# Patient Record
Sex: FEMALE | URBAN - METROPOLITAN AREA
[De-identification: names, ages, dates, MRNs, and addresses within clinical notes are randomized per-mention and may not be internally consistent; named-entity substitution may affect disease eponyms.]

---

## 2024-03-27 ENCOUNTER — INPATIENT (INPATIENT)
Facility: HOSPITAL | Age: 49
LOS: 2 days | Discharge: HOME | DRG: 389 | End: 2024-03-30
Attending: INTERNAL MEDICINE | Admitting: INTERNAL MEDICINE
Payer: MEDICAID

## 2024-03-27 VITALS
HEART RATE: 93 BPM | SYSTOLIC BLOOD PRESSURE: 111 MMHG | DIASTOLIC BLOOD PRESSURE: 52 MMHG | OXYGEN SATURATION: 97 % | TEMPERATURE: 98 F | RESPIRATION RATE: 20 BRPM | WEIGHT: 188.5 LBS | HEIGHT: 64 IN

## 2024-03-27 LAB
ALBUMIN SERPL ELPH-MCNC: 3.8 G/DL — SIGNIFICANT CHANGE UP (ref 3.3–5.2)
ALP SERPL-CCNC: 90 U/L — SIGNIFICANT CHANGE UP (ref 40–120)
ALT FLD-CCNC: 21 U/L — SIGNIFICANT CHANGE UP
ANION GAP SERPL CALC-SCNC: 14 MMOL/L — SIGNIFICANT CHANGE UP (ref 5–17)
APPEARANCE UR: CLEAR — SIGNIFICANT CHANGE UP
AST SERPL-CCNC: 30 U/L — SIGNIFICANT CHANGE UP
BASOPHILS # BLD AUTO: 0.04 K/UL — SIGNIFICANT CHANGE UP (ref 0–0.2)
BASOPHILS NFR BLD AUTO: 0.6 % — SIGNIFICANT CHANGE UP (ref 0–2)
BILIRUB SERPL-MCNC: 0.3 MG/DL — LOW (ref 0.4–2)
BILIRUB UR-MCNC: NEGATIVE — SIGNIFICANT CHANGE UP
BUN SERPL-MCNC: 11.5 MG/DL — SIGNIFICANT CHANGE UP (ref 8–20)
CALCIUM SERPL-MCNC: 8.6 MG/DL — SIGNIFICANT CHANGE UP (ref 8.4–10.5)
CHLORIDE SERPL-SCNC: 104 MMOL/L — SIGNIFICANT CHANGE UP (ref 96–108)
CO2 SERPL-SCNC: 22 MMOL/L — SIGNIFICANT CHANGE UP (ref 22–29)
COLOR SPEC: YELLOW — SIGNIFICANT CHANGE UP
CREAT SERPL-MCNC: 0.51 MG/DL — SIGNIFICANT CHANGE UP (ref 0.5–1.3)
DIFF PNL FLD: NEGATIVE — SIGNIFICANT CHANGE UP
EGFR: 116 ML/MIN/1.73M2 — SIGNIFICANT CHANGE UP
EOSINOPHIL # BLD AUTO: 0.31 K/UL — SIGNIFICANT CHANGE UP (ref 0–0.5)
EOSINOPHIL NFR BLD AUTO: 4.3 % — SIGNIFICANT CHANGE UP (ref 0–6)
GLUCOSE SERPL-MCNC: 92 MG/DL — SIGNIFICANT CHANGE UP (ref 70–99)
GLUCOSE UR QL: NEGATIVE MG/DL — SIGNIFICANT CHANGE UP
HCG SERPL-ACNC: <4 MIU/ML — SIGNIFICANT CHANGE UP
HCT VFR BLD CALC: 27.3 % — LOW (ref 34.5–45)
HGB BLD-MCNC: 7.4 G/DL — LOW (ref 11.5–15.5)
IMM GRANULOCYTES NFR BLD AUTO: 0.3 % — SIGNIFICANT CHANGE UP (ref 0–0.9)
KETONES UR-MCNC: NEGATIVE MG/DL — SIGNIFICANT CHANGE UP
LACTATE BLDV-MCNC: 0.8 MMOL/L — SIGNIFICANT CHANGE UP (ref 0.5–2)
LEUKOCYTE ESTERASE UR-ACNC: NEGATIVE — SIGNIFICANT CHANGE UP
LYMPHOCYTES # BLD AUTO: 2.3 K/UL — SIGNIFICANT CHANGE UP (ref 1–3.3)
LYMPHOCYTES # BLD AUTO: 31.7 % — SIGNIFICANT CHANGE UP (ref 13–44)
MCHC RBC-ENTMCNC: 17.8 PG — LOW (ref 27–34)
MCHC RBC-ENTMCNC: 27.1 GM/DL — LOW (ref 32–36)
MCV RBC AUTO: 65.6 FL — LOW (ref 80–100)
MONOCYTES # BLD AUTO: 0.52 K/UL — SIGNIFICANT CHANGE UP (ref 0–0.9)
MONOCYTES NFR BLD AUTO: 7.2 % — SIGNIFICANT CHANGE UP (ref 2–14)
NEUTROPHILS # BLD AUTO: 4.07 K/UL — SIGNIFICANT CHANGE UP (ref 1.8–7.4)
NEUTROPHILS NFR BLD AUTO: 55.9 % — SIGNIFICANT CHANGE UP (ref 43–77)
NITRITE UR-MCNC: NEGATIVE — SIGNIFICANT CHANGE UP
PH UR: 7 — SIGNIFICANT CHANGE UP (ref 5–8)
PLATELET # BLD AUTO: 252 K/UL — SIGNIFICANT CHANGE UP (ref 150–400)
POTASSIUM SERPL-MCNC: 3.9 MMOL/L — SIGNIFICANT CHANGE UP (ref 3.5–5.3)
POTASSIUM SERPL-SCNC: 3.9 MMOL/L — SIGNIFICANT CHANGE UP (ref 3.5–5.3)
PROT SERPL-MCNC: 7 G/DL — SIGNIFICANT CHANGE UP (ref 6.6–8.7)
PROT UR-MCNC: NEGATIVE MG/DL — SIGNIFICANT CHANGE UP
RBC # BLD: 4.16 M/UL — SIGNIFICANT CHANGE UP (ref 3.8–5.2)
RBC # FLD: 20.5 % — HIGH (ref 10.3–14.5)
SODIUM SERPL-SCNC: 140 MMOL/L — SIGNIFICANT CHANGE UP (ref 135–145)
SP GR SPEC: 1.03 — SIGNIFICANT CHANGE UP (ref 1–1.03)
UROBILINOGEN FLD QL: 2 MG/DL (ref 0.2–1)
WBC # BLD: 7.26 K/UL — SIGNIFICANT CHANGE UP (ref 3.8–10.5)
WBC # FLD AUTO: 7.26 K/UL — SIGNIFICANT CHANGE UP (ref 3.8–10.5)

## 2024-03-27 PROCEDURE — 99285 EMERGENCY DEPT VISIT HI MDM: CPT

## 2024-03-27 RX ORDER — ACETAMINOPHEN 500 MG
1000 TABLET ORAL ONCE
Refills: 0 | Status: COMPLETED | OUTPATIENT
Start: 2024-03-27 | End: 2024-03-27

## 2024-03-27 RX ORDER — IOHEXOL 300 MG/ML
30 INJECTION, SOLUTION INTRAVENOUS ONCE
Refills: 0 | Status: COMPLETED | OUTPATIENT
Start: 2024-03-27 | End: 2024-03-27

## 2024-03-27 RX ORDER — SODIUM CHLORIDE 9 MG/ML
1000 INJECTION INTRAMUSCULAR; INTRAVENOUS; SUBCUTANEOUS
Refills: 0 | Status: DISCONTINUED | OUTPATIENT
Start: 2024-03-27 | End: 2024-03-30

## 2024-03-27 RX ADMIN — SODIUM CHLORIDE 100 MILLILITER(S): 9 INJECTION INTRAMUSCULAR; INTRAVENOUS; SUBCUTANEOUS at 21:51

## 2024-03-27 RX ADMIN — Medication 400 MILLIGRAM(S): at 21:51

## 2024-03-27 RX ADMIN — IOHEXOL 30 MILLILITER(S): 300 INJECTION, SOLUTION INTRAVENOUS at 21:51

## 2024-03-27 NOTE — ED PROVIDER NOTE - PROGRESS NOTE DETAILS
Patient Pending CT scan. ~Nick ESCOBAR Patient pending final recs from Surgery and GYN. Case signed out to Lis ESCOBAR. ~SHAWN Romero SHAWN LOPEZ, patient re-assessed, comfortably sleeping, endorsing nausea, seen by GYN and surgery, GYN recommends rpt CBC and surgery pending final disposition by attending for CT findings of ileus vs pSBO.  Will continue to monitor. informed by RN H&H dropped, discussion had with patient for transfusion, declined as is a Jehov[employer] witness and does not believe in transfusions, informed of risk of benefits, including sepsis, death, permanent disability, patient understands and agrees to proceed. spoke to GYN not planning any surgical intervention, recommends serial H&H checks, possible iron transfusions, patient reports h/o anemia has gotten iron transfusions in past, still endorsing 6/10 abdominal pain localized to LLQ.  Surgery team also not planning on any surgical intervention.  Surgery and GYN spoke to each other, discussion was had for admission to GYN service. GYN advises medical admission, case d/w hospitalist Teddy, who also feels as though patient should be admitted to GYN service.  Plan to contact GYN directly for discussion.

## 2024-03-27 NOTE — ED ADULT TRIAGE NOTE - CHIEF COMPLAINT QUOTE
Patient presents to ED with c/o LLQ abdominal pain that started yesterday.  Denies N/V/D.  No meds PTA

## 2024-03-27 NOTE — ED PROVIDER NOTE - OBJECTIVE STATEMENT
47 yo female with LLQ pain since yesterday, constant non radiating  hx bowel obstruction  hx constipation, asthma - uses inhaler as needed   nka soc no cig or alc  no other symptoms

## 2024-03-27 NOTE — ED PROVIDER NOTE - ATTENDING APP SHARED VISIT CONTRIBUTION OF CARE
I performed the initial face to face bedside interview with this patient regarding history of present illness, review of symptoms and past medical, social and family history.  I completed an independent physical examination.  I was the initial provider who evaluated this patient.  The history, review of symptoms and examination was documented by me and I attest to the accuracy of the documentation.  I have signed out the follow up of any pending tests (i.e. labs, radiological studies) to the PA.  I have discussed the patient’s plan of care and disposition with the PA.  pt with LLQ abd pain and hx constipation and bowel obstruction   +TTP LLQ no G/R  plan is iv ivf iv pain meds labs ua preg test CT + oral and iv contrast to further assess condition  ddx obstruction, diverticulitis, other gi or gu etiology

## 2024-03-27 NOTE — ED PROVIDER NOTE - NS ED ROS FT
Constitutional: no fever, no chills  Eyes: no pain, no redness, no visual changes.  ENMT: no ear pain, no hearing problems, no nasal congestion/drainage, no throat pain, no neck pain/stiffness  CV: no chest pain, no edema  Resp: no cough, no dyspnea  GI: + abdominal pain, no bloating, + constipation, no diarrhea, no nausea, no vomiting.  : no dysuria, no hematuria  MSK: no msk pain, no weakness  Skin: no lesions, and no rashes   Neuro: no LOC, no headache, no sensory deficits, and no weakness

## 2024-03-27 NOTE — ED PROVIDER NOTE - CLINICAL SUMMARY MEDICAL DECISION MAKING FREE TEXT BOX
pt with LLQ abd pain and hx constipation and bowel obstruction   +TTP LLQ no G/R  plan is iv ivf iv pain meds labs ua preg test CT + oral and iv contrast to further assess condition  ddx obstruction, diverticulitis, other gi or gu etiology pt with LLQ abd pain and hx constipation and bowel obstruction   +TTP LLQ no G/R  plan is iv ivf iv pain meds labs ua preg test CT + oral and iv contrast to further assess condition  ddx obstruction, diverticulitis, other gi or gu etiology  --Labs revealed no leukocytosis, anemia, CMP grossly wnl, lactate wnl, urine wnl,   --CT revealed Gastric Meka-en-Y postoperative changes with patulous distention of the proximal and distal anastomosis, most pronounced proximally. Mildly prominent small bowel loops in the right side of the abdomen, ileus versus partial small bowel obstruction and adnexal cyst.   -U/S REVEALED 4.0 cm complex left ovarian cyst most likely reflecting hemorrhagic cyst.   Differential diagnosis includes endometrioma.  -GYN consulted XXX  -Surgery consulted XXX

## 2024-03-27 NOTE — ED PROVIDER NOTE - PHYSICAL EXAMINATION
General: well appearing, NAD  Head:  NC, AT  Eyes: EOMI, no scleral icterus  Ears: no erythema/drainage  Nose: midline, no bleeding/drainage  Throat: MMM  Cardiac: RRR, no apparent murmurs, no lower extremity edema  Respiratory: CTABL, equal chest wall expansions  Abdomen: soft, ND, +TTP LLQ, no rebound, no guarding, nonperitonitic  MSK/Vascular: full ROM, soft compartments, warm extremities  Neuro: AAOx3, sensation to light touch intact, finger to nose coordination intact, cranial nerves 2-12 intact  Psych: calm, cooperative, normal affect Xolair Counseling:  Patient informed of potential adverse effects including but not limited to fever, muscle aches, rash and allergic reactions.  The patient verbalized understanding of the proper use and possible adverse effects of Xolair.  All of the patient's questions and concerns were addressed.

## 2024-03-28 DIAGNOSIS — Z98.890 OTHER SPECIFIED POSTPROCEDURAL STATES: Chronic | ICD-10-CM

## 2024-03-28 DIAGNOSIS — N83.209 UNSPECIFIED OVARIAN CYST, UNSPECIFIED SIDE: ICD-10-CM

## 2024-03-28 LAB
ABO RH CONFIRMATION: SIGNIFICANT CHANGE UP
BASOPHILS # BLD AUTO: 0.04 K/UL — SIGNIFICANT CHANGE UP (ref 0–0.2)
BASOPHILS NFR BLD AUTO: 0.7 % — SIGNIFICANT CHANGE UP (ref 0–2)
BLD GP AB SCN SERPL QL: SIGNIFICANT CHANGE UP
EOSINOPHIL # BLD AUTO: 0.25 K/UL — SIGNIFICANT CHANGE UP (ref 0–0.5)
EOSINOPHIL NFR BLD AUTO: 4.2 % — SIGNIFICANT CHANGE UP (ref 0–6)
FOLATE SERPL-MCNC: >20 NG/ML — SIGNIFICANT CHANGE UP
HCT VFR BLD CALC: 24.1 % — LOW (ref 34.5–45)
HCT VFR BLD CALC: 27.6 % — LOW (ref 34.5–45)
HCT VFR BLD CALC: 28.6 % — LOW (ref 34.5–45)
HGB BLD-MCNC: 6.7 G/DL — CRITICAL LOW (ref 11.5–15.5)
HGB BLD-MCNC: 7.5 G/DL — LOW (ref 11.5–15.5)
HGB BLD-MCNC: 7.7 G/DL — LOW (ref 11.5–15.5)
IMM GRANULOCYTES NFR BLD AUTO: 0.3 % — SIGNIFICANT CHANGE UP (ref 0–0.9)
LYMPHOCYTES # BLD AUTO: 2.28 K/UL — SIGNIFICANT CHANGE UP (ref 1–3.3)
LYMPHOCYTES # BLD AUTO: 37.9 % — SIGNIFICANT CHANGE UP (ref 13–44)
MCHC RBC-ENTMCNC: 17.9 PG — LOW (ref 27–34)
MCHC RBC-ENTMCNC: 18 PG — LOW (ref 27–34)
MCHC RBC-ENTMCNC: 18.1 PG — LOW (ref 27–34)
MCHC RBC-ENTMCNC: 26.9 GM/DL — LOW (ref 32–36)
MCHC RBC-ENTMCNC: 27.2 GM/DL — LOW (ref 32–36)
MCHC RBC-ENTMCNC: 27.8 GM/DL — LOW (ref 32–36)
MCV RBC AUTO: 65.1 FL — LOW (ref 80–100)
MCV RBC AUTO: 66 FL — LOW (ref 80–100)
MCV RBC AUTO: 66.8 FL — LOW (ref 80–100)
MONOCYTES # BLD AUTO: 0.47 K/UL — SIGNIFICANT CHANGE UP (ref 0–0.9)
MONOCYTES NFR BLD AUTO: 7.8 % — SIGNIFICANT CHANGE UP (ref 2–14)
NEUTROPHILS # BLD AUTO: 2.96 K/UL — SIGNIFICANT CHANGE UP (ref 1.8–7.4)
NEUTROPHILS NFR BLD AUTO: 49.1 % — SIGNIFICANT CHANGE UP (ref 43–77)
PLATELET # BLD AUTO: 251 K/UL — SIGNIFICANT CHANGE UP (ref 150–400)
PLATELET # BLD AUTO: 253 K/UL — SIGNIFICANT CHANGE UP (ref 150–400)
PLATELET # BLD AUTO: 274 K/UL — SIGNIFICANT CHANGE UP (ref 150–400)
RBC # BLD: 3.7 M/UL — LOW (ref 3.8–5.2)
RBC # BLD: 4.18 M/UL — SIGNIFICANT CHANGE UP (ref 3.8–5.2)
RBC # BLD: 4.28 M/UL — SIGNIFICANT CHANGE UP (ref 3.8–5.2)
RBC # FLD: 20.3 % — HIGH (ref 10.3–14.5)
RBC # FLD: 20.6 % — HIGH (ref 10.3–14.5)
RBC # FLD: 20.7 % — HIGH (ref 10.3–14.5)
VIT B12 SERPL-MCNC: >2000 PG/ML — HIGH (ref 232–1245)
WBC # BLD: 6.02 K/UL — SIGNIFICANT CHANGE UP (ref 3.8–10.5)
WBC # BLD: 6.12 K/UL — SIGNIFICANT CHANGE UP (ref 3.8–10.5)
WBC # BLD: 8.3 K/UL — SIGNIFICANT CHANGE UP (ref 3.8–10.5)
WBC # FLD AUTO: 6.02 K/UL — SIGNIFICANT CHANGE UP (ref 3.8–10.5)
WBC # FLD AUTO: 6.12 K/UL — SIGNIFICANT CHANGE UP (ref 3.8–10.5)
WBC # FLD AUTO: 8.3 K/UL — SIGNIFICANT CHANGE UP (ref 3.8–10.5)

## 2024-03-28 PROCEDURE — 76830 TRANSVAGINAL US NON-OB: CPT | Mod: 26

## 2024-03-28 PROCEDURE — 99253 IP/OBS CNSLTJ NEW/EST LOW 45: CPT

## 2024-03-28 PROCEDURE — 99255 IP/OBS CONSLTJ NEW/EST HI 80: CPT

## 2024-03-28 PROCEDURE — 74177 CT ABD & PELVIS W/CONTRAST: CPT | Mod: 26,MC

## 2024-03-28 PROCEDURE — 76856 US EXAM PELVIC COMPLETE: CPT | Mod: 26

## 2024-03-28 PROCEDURE — 99223 1ST HOSP IP/OBS HIGH 75: CPT

## 2024-03-28 RX ORDER — IRON SUCROSE 20 MG/ML
200 INJECTION, SOLUTION INTRAVENOUS ONCE
Refills: 0 | Status: COMPLETED | OUTPATIENT
Start: 2024-03-28 | End: 2024-03-28

## 2024-03-28 RX ORDER — ERYTHROPOIETIN 10000 [IU]/ML
20000 INJECTION, SOLUTION INTRAVENOUS; SUBCUTANEOUS
Refills: 0 | Status: DISCONTINUED | OUTPATIENT
Start: 2024-03-28 | End: 2024-03-28

## 2024-03-28 RX ORDER — MORPHINE SULFATE 50 MG/1
4 CAPSULE, EXTENDED RELEASE ORAL ONCE
Refills: 0 | Status: DISCONTINUED | OUTPATIENT
Start: 2024-03-28 | End: 2024-03-28

## 2024-03-28 RX ORDER — ONDANSETRON 8 MG/1
4 TABLET, FILM COATED ORAL ONCE
Refills: 0 | Status: COMPLETED | OUTPATIENT
Start: 2024-03-28 | End: 2024-03-28

## 2024-03-28 RX ORDER — METOCLOPRAMIDE HCL 10 MG
10 TABLET ORAL ONCE
Refills: 0 | Status: COMPLETED | OUTPATIENT
Start: 2024-03-28 | End: 2024-03-28

## 2024-03-28 RX ORDER — INFLUENZA VIRUS VACCINE 15; 15; 15; 15 UG/.5ML; UG/.5ML; UG/.5ML; UG/.5ML
0.5 SUSPENSION INTRAMUSCULAR ONCE
Refills: 0 | Status: DISCONTINUED | OUTPATIENT
Start: 2024-03-28 | End: 2024-03-30

## 2024-03-28 RX ORDER — PANTOPRAZOLE SODIUM 20 MG/1
40 TABLET, DELAYED RELEASE ORAL
Refills: 0 | Status: DISCONTINUED | OUTPATIENT
Start: 2024-03-28 | End: 2024-03-30

## 2024-03-28 RX ORDER — FOLIC ACID 0.8 MG
1 TABLET ORAL DAILY
Refills: 0 | Status: DISCONTINUED | OUTPATIENT
Start: 2024-03-28 | End: 2024-03-30

## 2024-03-28 RX ORDER — PREGABALIN 225 MG/1
1000 CAPSULE ORAL ONCE
Refills: 0 | Status: COMPLETED | OUTPATIENT
Start: 2024-03-28 | End: 2024-03-28

## 2024-03-28 RX ORDER — ERYTHROPOIETIN 10000 [IU]/ML
20000 INJECTION, SOLUTION INTRAVENOUS; SUBCUTANEOUS
Refills: 0 | Status: DISCONTINUED | OUTPATIENT
Start: 2024-03-28 | End: 2024-03-30

## 2024-03-28 RX ORDER — ASCORBIC ACID 60 MG
500 TABLET,CHEWABLE ORAL DAILY
Refills: 0 | Status: DISCONTINUED | OUTPATIENT
Start: 2024-03-28 | End: 2024-03-30

## 2024-03-28 RX ORDER — ACETAMINOPHEN 500 MG
1000 TABLET ORAL ONCE
Refills: 0 | Status: COMPLETED | OUTPATIENT
Start: 2024-03-28 | End: 2024-03-28

## 2024-03-28 RX ORDER — IRON SUCROSE 20 MG/ML
200 INJECTION, SOLUTION INTRAVENOUS EVERY 24 HOURS
Refills: 0 | Status: DISCONTINUED | OUTPATIENT
Start: 2024-03-28 | End: 2024-03-30

## 2024-03-28 RX ORDER — KETOROLAC TROMETHAMINE 30 MG/ML
30 SYRINGE (ML) INJECTION ONCE
Refills: 0 | Status: DISCONTINUED | OUTPATIENT
Start: 2024-03-28 | End: 2024-03-28

## 2024-03-28 RX ADMIN — SODIUM CHLORIDE 100 MILLILITER(S): 9 INJECTION INTRAMUSCULAR; INTRAVENOUS; SUBCUTANEOUS at 08:09

## 2024-03-28 RX ADMIN — ERYTHROPOIETIN 20000 UNIT(S): 10000 INJECTION, SOLUTION INTRAVENOUS; SUBCUTANEOUS at 14:10

## 2024-03-28 RX ADMIN — Medication 10 MILLIGRAM(S): at 10:06

## 2024-03-28 RX ADMIN — MORPHINE SULFATE 4 MILLIGRAM(S): 50 CAPSULE, EXTENDED RELEASE ORAL at 04:43

## 2024-03-28 RX ADMIN — ONDANSETRON 4 MILLIGRAM(S): 8 TABLET, FILM COATED ORAL at 08:09

## 2024-03-28 RX ADMIN — Medication 400 MILLIGRAM(S): at 23:45

## 2024-03-28 RX ADMIN — PREGABALIN 1000 MICROGRAM(S): 225 CAPSULE ORAL at 15:59

## 2024-03-28 RX ADMIN — Medication 500 MILLIGRAM(S): at 15:59

## 2024-03-28 RX ADMIN — IRON SUCROSE 110 MILLIGRAM(S): 20 INJECTION, SOLUTION INTRAVENOUS at 10:58

## 2024-03-28 RX ADMIN — Medication 30 MILLIGRAM(S): at 11:00

## 2024-03-28 RX ADMIN — Medication 1 MILLIGRAM(S): at 14:10

## 2024-03-28 RX ADMIN — Medication 30 MILLIGRAM(S): at 10:05

## 2024-03-28 RX ADMIN — IRON SUCROSE 200 MILLIGRAM(S): 20 INJECTION, SOLUTION INTRAVENOUS at 14:00

## 2024-03-28 NOTE — CONSULT NOTE ADULT - SUBJECTIVE AND OBJECTIVE BOX
CRYSTAL BURNETT is a 48y G_ who presented to the ED for _. GYN consulted for _.    Denies HA, lightheadedness, dizziness, visual disturbances, SOB, CP, nausea and vomiting.     LMP:     POBHx:  PGYNHx:   PMHx:  Meds:   All:   PSHx:   Social Hx:    Physical Exam  T(C): 36.8 (03-28-24 @ 04:56), Max: 36.8 (03-28-24 @ 04:56)  HR: 71 (03-28-24 @ 04:56) (71 - 93)  BP: 107/66 (03-28-24 @ 04:56) (107/66 - 111/52)  RR: 19 (03-28-24 @ 04:56) (19 - 20)  SpO2: 100% (03-28-24 @ 04:56) (97% - 100%)    Gen: AAOx3, NAD  Resp: breathing comfortably on RA  Abd: + BS in all 4 quadrants. Nontender, nondistended.  No guarding, no rebound tenderness.   Ext: No swelling, redness or tenderness in the UE or LE b/l.       Labs:                        7.4    7.26  )-----------( 252      ( 27 Mar 2024 21:41 )             27.3     03-27    140  |  104  |  11.5  ----------------------------<  92  3.9   |  22.0  |  0.51    Ca    8.6      27 Mar 2024 21:41    TPro  7.0  /  Alb  3.8  /  TBili  0.3<L>  /  DBili  x   /  AST  30  /  ALT  21  /  AlkPhos  90  03-27      HCG Quantitative, Serum: <4.0 mIU/mL (03-27-24 @ 21:41)    Imaging:   < from: US Transvaginal (03.28.24 @ 04:16) >    ACC: [id number] EXAM:  US TRANSVAGINAL   ORDERED BY: KOTA DOUGLASS     ACC: [id number] EXAM:  US PELVIC COMPLETE   ORDERED BY: KOTA DOUGLASS     PROCEDURE DATE:  03/28/2024          INTERPRETATION:  CLINICAL INFORMATION: Left lower quadrant pain.    LMP: 3/13/2024    COMPARISON: None available.    TECHNIQUE:  Endovaginal and transabdominal pelvic sonogram. Color and Spectral   Doppler was performed.    FINDINGS:  Uterus: 11.3 x 5.1 x 6.6 cm. Within normal limits.  Endometrium: 16 mm. Within normal limits.    Right ovary: 4.2 x 2.5 x 4.0 cm. Contains a simple appearing cyst   measuring up to 2.9 cm. Normal arterial and venous waveforms.  Left ovary: 5.7 x 3.7 x 5 5 5 cm. Contains a 4.0 x 3.3 x 4.1 cm complex   avascular cyst with internal echoes. Normal arterial and venous waveforms.    Fluid: Small free fluid in cul-de-sac.    IMPRESSION:  4.0 cm complex left ovarian cyst most likely reflecting hemorrhagic cyst.   Differential diagnosis includes endometrioma. Follow-up ultrasound in 6-8  weeks is recommended.    Flow identified within both ovaries at time of imaging.    --- End of Report ---    SHAGUFTA PALMA MD; Attending Radiologist  This document has been electronically signed. Mar 28 2024  4:22AM    < end of copied text >   CRYSTAL BURNETT is a 48y  LMP 3/13/24 who presented to the ED for LLQ pain x3days. GYN consulted for TVUS finding of 4cm L ovarian cyst. Patient endorses constant, dull, 6/10 LLQ pain x 3 days, has tried taking GasX at home with no relief and received Tylenol and morphine in the ED with some relief. Patient has history of ovarian cyst on R side (largest measuring 4.3 cm in ). Denies abnormal discharge and current vaginal bleeding. Denies nausea and vomiting. Denies fatigue, SOB, lightheadedness, dizziness. Follows with Dr[facility] Vigil at [facility] for GYN care, last seen .     LMP: 3/13/24    POBHx: FT  x7, PT CS x1  PGYNHx: history of ovarian cyst on R side (largest measuring 4.3 cm in ), denies hx, fibroids, abnormal paps  PMHx: anemia (follows with hematologist last iron transfusion 2023), asthma  Meds: albuterol PRN  All: NKDA  PSHx: gastric bypass 2000, CSx1 (), lysis of adhesions secondary to SBO (, ), gastric bypass  Social Hx: denies EtOH, tobacco, and illicit drug use    Physical Exam  T(C): 36.8 (24 @ 04:56), Max: 36.8 (24 @ 04:56)  HR: 71 (24 @ 04:56) (71 - 93)  BP: 107/66 (24 @ 04:56) (107/66 - 111/52)  RR: 19 (24 @ 04:56) (19 - 20)  SpO2: 100% (24 @ 04:56) (97% - 100%)    Gen: AAOx3, NAD  Resp: breathing comfortably on RA  Abd: LLQ moderately tender to palpation, nondistended.    Ext: No swelling, redness or tenderness in the UE or LE b/l.       Labs:                        7.4    7.26  )-----------( 252      ( 27 Mar 2024 21:41 )             27.3         140  |  104  |  11.5  ----------------------------<  92  3.9   |  22.0  |  0.51    Ca    8.6      27 Mar 2024 21:41    TPro  7.0  /  Alb  3.8  /  TBili  0.3<L>  /  DBili  x   /  AST  30  /  ALT  21  /  AlkPhos  90        HCG Quantitative, Serum: <4.0 mIU/mL (24 @ 21:41)    Imaging:   < from: US Transvaginal (24 @ 04:16) >    ACC: [id number] EXAM:  US TRANSVAGINAL   ORDERED BY: KOTA DOUGLASS     ACC: [id number] EXAM:  US PELVIC COMPLETE   ORDERED BY: KOTA DOUGLASS     PROCEDURE DATE:  2024      INTERPRETATION:  CLINICAL INFORMATION: Left lower quadrant pain.    LMP: 3/13/2024    COMPARISON: None available.    TECHNIQUE:  Endovaginal and transabdominal pelvic sonogram. Color and Spectral   Doppler was performed.    FINDINGS:  Uterus: 11.3 x 5.1 x 6.6 cm. Within normal limits.  Endometrium: 16 mm. Within normal limits.    Right ovary: 4.2 x 2.5 x 4.0 cm. Contains a simple appearing cyst   measuring up to 2.9 cm. Normal arterial and venous waveforms.  Left ovary: 5.7 x 3.7 x 5 5 5 cm. Contains a 4.0 x 3.3 x 4.1 cm complex   avascular cyst with internal echoes. Normal arterial and venous waveforms.    Fluid: Small free fluid in cul-de-sac.    IMPRESSION:  4.0 cm complex left ovarian cyst most likely reflecting hemorrhagic cyst.   Differential diagnosis includes endometrioma. Follow-up ultrasound in 6-8  weeks is recommended.    Flow identified within both ovaries at time of imaging.    --- End of Report ---    SHAGUFTA PALMA MD; Attending Radiologist  This document has been electronically signed. Mar 28 2024  4:22AM    < end of copied text >    < from: CT Abdomen and Pelvis w/ Oral Cont and w/ IV Cont (24 @ 01:43) >    ACC: [id number] EXAM:  CT ABDOMEN AND PELVIS OC IC   ORDERED BY: JOSE AWAD     *** ADDENDUM # 1 ***    Gastric Meka-en-Y postoperative changes with patulous distention of the   proximal and distal anastomosis, most pronounced proximally. Mildly   prominent small bowel loops in the right side of the abdomen, ileus   versus partial small bowel obstruction. Abdominal radiographic imaging   follow-up if clinically warranted.    --- End of Report ---    *** END OF ADDENDUM # 1 ***      PROCEDURE DATE:  2024      INTERPRETATION:  CLINICAL INFORMATION: Left lower quadrant abdominal pain.    COMPARISON: None.    PROCEDURE:  CT of the Abdomen and Pelvis was performed with intravenous contrast.  Intravenous contrast: 90 ml Omnipaque 350. 10 ml discarded.  Oral contrast: positive contrast was administered.  Sagittal and coronal reformats were performed.    FINDINGS:    LOWER CHEST: Within normal limits.    LIVER: Within normal limits.  BILE DUCTS: Normal caliber.  GALLBLADDER: Cholelithiasis.  SPLEEN: Within normal limits.  PANCREAS: Within normal limits.  ADRENALS: Within normal limits.  KIDNEYS/URETERS: Within normal limits.    BLADDER: Within normal limits.  REPRODUCTIVE ORGANS: Globular uterus. Incompletely characterized left   adnexal complex cystic lesion measuring 4.2 x 3.8 cm for which pelvic   ultrasound is suggested. Simple right adnexal cyst measuring up to 2.8 cm.    BOWEL: Gastric postoperative changes. Small bowel postsurgical changes.   Patulous anastomotic site measuring measuring 10.8 cm in craniocaudal   dimension in the mid upper abdomen (23:5). Additional mildly prominent   small bowel loops in the right abdomen raising concerning for ileus   versus at least partial small bowel obstruction as oral contrast is   visualized within decompressed distal small bowel and large bowel loops.   No overt bowel wall thickening or mesenteric edema. Normal appendix.  PERITONEUM: No ascites.  VESSELS:  Normal caliber.  RETROPERITONEUM: No lymphadenopathy.  ABDOMINAL WALL: Mild subcutaneous edema.  BONES: Degenerative changes.    IMPRESSION:    Findings concerning for ileus versus at least partial small bowel   obstruction with oral contrast visualized within decompressed distal   small bowel and large bowel loops. No overt bowel wall thickening or   mesenteric edema.    Cholelithiasis.    Incompletely characterized left adnexal complex cystic lesion measuring   4.2 x 3.8 cm for which pelvic ultrasound is suggested.    --- End of Report ---    ***Please see the addendum at the top of this report. It may contain   additional important information or changes.****    DAMARIS GREEN MD; Attending Radiologist  This document has been electronically signed. Mar 28 2024  2:04AM  1st Addendum: DAMARIS GREEN MD; Attending Radiologist  The first addendum was electronically signed on: Mar 28 2024  5:05AM.    < end of copied text >

## 2024-03-28 NOTE — PATIENT PROFILE ADULT - FALL HARM RISK - HARM RISK INTERVENTIONS

## 2024-03-28 NOTE — CONSULT NOTE ADULT - SUBJECTIVE AND OBJECTIVE BOX
HPI: 48y Female with PSHx of open gastric bypass , SBO x2 required FIORELLA present to ED with 2 days of abdominal pain , nausea , last BF was yesterday , last time passed flatus was few hours ago ,  denies fevers/chills,  lightheadedness/dizziness, SOB/chest pain          ROS: 10-system review is otherwise negative except HPI above.      PAST MEDICAL & SURGICAL HISTORY:    FAMILY HISTORY:    Family history not pertinent as reviewed with the patient.    SOCIAL HISTORY:  Denies any toxic habits    ALLERGIES: NKA No Known Allergies      HOME MEDICATIONS: ***  Home Medications:      --------------------------------------------------------------------------------------------    PHYSICAL EXAM:   General: NAD, Lying in bed comfortably  Neuro: A+Ox3  HEENT: EOMI, PERRLA, MMM  Cardio: RRR  Resp: Non labored breathing on RA  GI/Abd: Soft, LLQ tenderness /ND, no rebound/guarding, no masses palpated  Vascular: All 4 extremities warm and well perfused.   Pelvis: stable  Musculoskeletal: All 4 extremities moving spontaneously, no limitations, no spinal tenderness.  --------------------------------------------------------------------------------------------    LABS                 7.4    7.26   )----------(  252       ( 27 Mar 2024 21:41 )               27.3      140    |  104    |  11.5   ----------------------------<  92         ( 27 Mar 2024 21:41 )  3.9     |  22.0   |  0.51     Ca    8.6        ( 27 Mar 2024 21:41 )    TPro  7.0    /  Alb  3.8    /  TBili  0.3    /  DBili  x      /  AST  30     /  ALT  21     /  AlkPhos  90     ( 27 Mar 2024 21:41 )    LIVER FUNCTIONS - ( 27 Mar 2024 21:41 )  Alb: 3.8 g/dL / Pro: 7.0 g/dL / ALK PHOS: 90 U/L / ALT: 21 U/L / AST: 30 U/L / GGT: x               CAPILLARY BLOOD GLUCOSE        Urinalysis Basic - ( 27 Mar 2024 21:53 )    Color: Yellow / Appearance: Clear / S.028 / pH: x  Gluc: x / Ketone: Negative mg/dL  / Bili: Negative / Urobili: 2.0 mg/dL   Blood: x / Protein: Negative mg/dL / Nitrite: Negative   Leuk Esterase: Negative / RBC: x / WBC x   Sq Epi: x / Non Sq Epi: x / Bacteria: x        21:41 - VBG - pH:       | pCO2:       | pO2:       | Lactate: 0.80     --------------------------------------------------------------------------------------------  IMAGING  ***

## 2024-03-28 NOTE — ED ADULT NURSE NOTE - OBJECTIVE STATEMENT
Pt c/o LLQ abdominal pain that started yesterday. Pt denies taking any medications at home to relieve pain. Pt denies any CP, SOB, HA, dizziness, vision changes, NVD, fever/chills, cough, and urinary symptoms at this time. Resp even and unlabored. NAD present. Pt made aware of plan of care and verbalized understanding.

## 2024-03-28 NOTE — CONSULT NOTE ADULT - NS ATTEND AMEND GEN_ALL_CORE FT
I attest my time as attending is greater than 50% of the total combined time spent on qualifying patient care activities by the PA/NP and attending.   I have made amendments to the documentation where necessary.

## 2024-03-28 NOTE — ED ADULT NURSE REASSESSMENT NOTE - NS ED NURSE REASSESS COMMENT FT1
Iron infusion still infusing on dial-a-flow.
Iron infusion still infusing on dial-a-flow.
Pt laying in bed, resting. Pt c/o headache and abd pain. Pt A&Ox4 in NAD @ this time. RR even & unlabored. Additional medications ordered. Pt awaiting consult. Safety maintained.
This RN @ bedside with SHAWN Bravo. Pt made aware of Hgb 6.7. Per pt she is a [employer]ast[employer]ism and does not consent to blood transfusion. Risks & benefits explained by SHAWN Bravo.
Assumed care of pt from AYAAN Flores.     Pt laying in bed, resting. Pt c/o LLQ pain. Pt A&Ox4 in NAD @ this time. RR even & unlabored. Pt awaiting repeat labs. Family @ bedside. Safety maintained.
Pt sitting up in bed, eating lunch tray. Pt A&Ox4 in NAD @ this time. RR even & unlabored. Pt c/o LLQ pain. Pt denies any pain, complaints, or needs @ this time. Pt awaiting admission. Safety maintained.     Report called to CDU by AYAAN Mcghee. Pt taken to CDU10H.

## 2024-03-28 NOTE — H&P ADULT - HISTORY OF PRESENT ILLNESS
48y  LMP 3/13/24, hx of open gastric bypass , SBO x2 required FIORELLA , hx of chronic anemia, [employer]. comes to ED with 2 days of abdominal pain LLQ ,  nausea. / , last last BM was yesterday , passing flatus. denies fevers/chills,  lightheadedness/dizziness, SOB/chest pain.  Denies abnormal discharge and current vaginal bleeding. follows with Dr. Vigil at [facility] for GYN care, last seen . denies any amaya / hematochezia menorrhagia.    ct abd / p in ed showed ileus / vs partial sbo. seen by surgery. ruled out sbo/ ileus. us [martha/ tv shows left hemorrhagic cyst. seen by gyne. Discussed case with gyne attending dr. Arabella ness , no acute interventions at this time. recommending medicine admission.

## 2024-03-28 NOTE — H&P ADULT - NSICDXPASTMEDICALHX_GEN_ALL_CORE_FT
PAST MEDICAL HISTORY:  Anemia of chronic disease     Refusal of blood transfusions as patient is [employer]

## 2024-03-28 NOTE — CONSULT NOTE ADULT - ASSESSMENT
incomplete     48y PMH open gastric bypass 2000, SBO x2 required FIORELLA, who presented to the ED for LLQ pain x3days. Patient JW found to have hgb 7.4.    -3/28/24-CT A/P -Gastric Meka-en-Y postoperative changes with patulous distention of the proximal and distal anastomosis, most pronounced proximally. Mildly prominent small bowel loops in the right side of the abdomen, ileus versus partial small bowel obstruction. Incompletely characterized left adnexal complex cystic lesion measuring 4.2 x 3.8 cm  3/28/24- Transvaginal US-4.0 cm complex left ovarian cyst most likely reflecting hemorrhagic cyst.     #Anemia  -patient JW prensenting w/ partial SBO  -hgb on admission 7.4 then dropped to 6.7     incomplete     48y PMH open gastric bypass 2000, SBO x2 required FIORELLA, who presented to the ED for LLQ pain x3days. Patient JW found to have hgb 7.4.    -3/28/24-CT A/P -Gastric Meka-en-Y postoperative changes with patulous distention of the proximal and distal anastomosis, most pronounced proximally. Mildly prominent small bowel loops in the right side of the abdomen, ileus versus partial small bowel obstruction. Incompletely characterized left adnexal complex cystic lesion measuring 4.2 x 3.8 cm  3/28/24- Transvaginal US-4.0 cm complex left ovarian cyst most likely reflecting hemorrhagic cyst.     #Anemia  -patient JW prensenting w/ partial SBO  -hgb on admission 7.4 then dropped to 6.7  -patient ordered for IV iron, B12, folic acid  RECS  -send anemia w/u (total iron, TIBC %sat, ferritin, B12, folate, TSH)  -continue Folic acid daily   -use pediatric tubes when possible    -minimal blood draws if possible       Thank you for the referral. Will continue to monitor the patient.  Please call with any questions [phone]  Above reviewed with Attending Dr. Hernandez   [facility]  [address] [place] [address]  [phone]  *Note not finalized until signed by Attending Physician      incomplete     48y PMH open gastric bypass 2000, SBO x2 required FIORELLA, who presented to the ED for LLQ pain x3days. Patient JW found to have hgb 7.4.    -3/28/24-CT A/P -Gastric Meka-en-Y postoperative changes with patulous distention of the proximal and distal anastomosis, most pronounced proximally. Mildly prominent small bowel loops in the right side of the abdomen, ileus versus partial small bowel obstruction. Incompletely characterized left adnexal complex cystic lesion measuring 4.2 x 3.8 cm  3/28/24- Transvaginal US-4.0 cm complex left ovarian cyst most likely reflecting hemorrhagic cyst.     #Anemia  -patient JW prensenting w/ partial SBO  -hgb on admission 7.4 then dropped to 6.7  -patient ordered for IV iron, B12, folic acid  RECS  -send anemia w/u (total iron, TIBC %sat, ferritin, B12, folate, TSH)  -continue Folic acid daily   -use pediatric tubes when possible  -minimal blood draws if possible   -r/o acute blood loss      Thank you for the referral. Will continue to monitor the patient.  Please call with any questions [phone]  Above reviewed with Attending Dr. Hernandez   [facility] [place]  [address] [place] [address]  [phone]  *Note not finalized until signed by Attending Physician        48y PMH open gastric bypass 2000, SBO x2 required FIORELLA, who presented to the ED for LLQ pain x3days. Patient JW found to have hgb 7.4.    -3/28/24-CT A/P -Gastric Meka-en-Y postoperative changes with patulous distention of the proximal and distal anastomosis, most pronounced proximally. Mildly prominent small bowel loops in the right side of the abdomen, ileus versus partial small bowel obstruction. Incompletely characterized left adnexal complex cystic lesion measuring 4.2 x 3.8 cm  3/28/24- Transvaginal US-4.0 cm complex left ovarian cyst most likely reflecting hemorrhagic cyst.     #Anemia  -patient JW not accepting any blood products presenting  w/ partial ?SBO vs ileus   -hgb on admission 7.4 then dropped to 6.7  -patient ordered for IV iron, B12, folic acid, and Procrit   RECS  -send anemia w/u (total iron, TIBC %sat, ferritin, B12, folate, TSH) please add on to previous labs  -continue IV iron 200mg daily x 5 doses    -continue Folic acid daily   -continue procrit daily  x 3 days and then can kiet down to 3 x week 50,000units M,W,F  -use pediatric tubes when possible  -minimal blood draws if possible   -r/o acute blood loss      Thank you for the referral.   Please call with any questions [phone]  Above reviewed with Attending Dr. Hernandez   [facility] [place]  [address] [place] [address]  [phone]  *Note not finalized until signed by Attending Physician

## 2024-03-28 NOTE — CONSULT NOTE ADULT - ATTENDING COMMENTS
Patient with left lower abdominal pain and tenderness  No bariatric surgery intervention indicated at this time  Recommend GYN evaluation   Will follow

## 2024-03-28 NOTE — H&P ADULT - NSHPPHYSICALEXAM_GEN_ALL_CORE
Vital Signs Last 24 Hrs  T(C): 36.6 (28 Mar 2024 07:45), Max: 36.8 (28 Mar 2024 04:56)  T(F): 97.8 (28 Mar 2024 07:45), Max: 98.2 (28 Mar 2024 04:56)  HR: 72 (28 Mar 2024 09:53) (71 - 93)  BP: 105/65 (28 Mar 2024 09:53) (96/63 - 111/52)  BP(mean): --  RR: 18 (28 Mar 2024 09:53) (10 - 20)  SpO2: 99% (28 Mar 2024 09:53) (96% - 100%)    Parameters below as of 28 Mar 2024 09:53  Patient On (Oxygen Delivery Method): room air      PHYSICAL EXAM:   General: NAD, Lying in bed comfortably  Neuro: A+Ox3  HEENT: EOMI, PERRLA, mm mild dry   Cardio: RRR, s1 , s2, rrr , no m/r   Resp: clear b/l   GI/Abd: Soft, LLQ tenderness on palpation, mild guarding , no rebound, bs nl   Vascular: All 4 extremities warm and well perfused.   Pelvis: stable  Musculoskeletal: All 4 extremities moving spontaneously, no limitations, no spinal tenderness.  neuro : non focal

## 2024-03-28 NOTE — H&P ADULT - NSHPLABSRESULTS_GEN_ALL_CORE
7.5    6.12  )-----------( 251      ( 28 Mar 2024 11:45 )             27.6   03-27    140  |  104  |  11.5  ----------------------------<  92  3.9   |  22.0  |  0.51    Ca    8.6      27 Mar 2024 21:41    TPro  7.0  /  Alb  3.8  /  TBili  0.3<L>  /  DBili  x   /  AST  30  /  ALT  21  /  AlkPhos  90  03-27

## 2024-03-28 NOTE — CONSULT NOTE ADULT - ASSESSMENT
Assessment   CRYSTAL BURNETT is a 48y G_ who presented to the ED for _. GYN consulted for _.    -VSS  -H/H:  -TVUS:  -bhcg:  -Blood type:    Discussed with  __. A/P: CRYSTAL BURNETT is a 48y  LMP 3/13/24 who presented to the ED for LLQ pain    -VSS  -H/H: 7.4 > repeat in 12 hours  -TVUS: 4.0 cm complex left ovarian cyst most likely reflecting hemorrhagic cyst. Differential diagnosis includes endometrioma. 2.9cm simple appearing cyst on R side. Follow-up ultrasound in 6-8weeks is recommended.  -bhcg: neg     A/P: CRYSTAL BURNETT is a 48y  LMP 3/13/24 who presented to the ED for LLQ pain, TVUS significant for 4.7cm L ovarian cyst and 2.9cm R ovarian cysts.    -VSS  -H/H: 7.4 > repeat in 12 hours  -TVUS: 4.0 cm complex left ovarian cyst most likely reflecting hemorrhagic cyst. Differential diagnosis includes endometrioma. 2.9cm simple appearing cyst on R side. Follow-up ultrasound in 6-8weeks is recommended.  -CT: Mildly prominent small bowel loops in the right side of the abdomen, ileus versus partial small bowel obstruction.   -bhcg: neg    Patient with hx of ovarian cyst, based on TVUS likely hemorraghic in origin. NSAIDs for pain control, plan to repeat H/H in 12 hours. Low suspicion for ovarian torsion as pain is dull in nature without nausea and vomiting. GYN will be on standby if to go to OR with general surgery.    Discussed with Dr. Kinney   A/P: CRYSTAL BURNETT is a 48y  LMP 3/13/24 who presented to the ED for LLQ pain, TVUS significant for 4.7cm L ovarian cyst and 2.9cm R ovarian cysts.    -VSS  -H/H: 7.4 > repeat in 12 hours  -TVUS: 4.0 cm complex left ovarian cyst most likely reflecting hemorrhagic cyst. Differential diagnosis includes endometrioma. 2.9cm simple appearing cyst on R side. Follow-up ultrasound in 6-8weeks is recommended.  -CT: Mildly prominent small bowel loops in the right side of the abdomen, ileus versus partial small bowel obstruction.   -bhcg: neg    Patient with hx of ovarian cyst, based on TVUS likely hemorraghic in origin. NSAIDs for pain control, plan to repeat H/H in 12 hours. Low suspicion for ovarian torsion as pain is dull in nature without nausea and vomiting. GYN will be on standby if to go to OR with general surgery.    Discussed with Dr. Kinney    Addendum:    Subjective Hx, Physical Exam, Laboratory & Imaging results reviewed.  I agree with the Resident Physician's assessment and plan of care, as discussed above.  Call and follow up parameters reviewed at length.  She was given the opportunity to ask questions and all were addressed.     Zak Kinney, DO     A/P: CRYSTAL BURNETT is a 48y  LMP 3/13/24 who presented to the ED for LLQ pain, TVUS significant for 4.7cm L ovarian cyst and 2.9cm R ovarian cysts.    -VSS  -H/H: 7.4 > repeat in 12 hours  -TVUS: 4.0 cm complex left ovarian cyst most likely reflecting hemorrhagic cyst. Differential diagnosis includes endometrioma. 2.9cm simple appearing cyst on R side. Follow-up ultrasound in 6-8weeks is recommended.  -CT: Mildly prominent small bowel loops in the right side of the abdomen, ileus versus partial small bowel obstruction.   -bhcg: neg    Patient with hx of ovarian cyst, based on TVUS likely hemorraghic in origin. Tylenol PRN for pain control, plan to repeat H/H in 12 hours. Low suspicion for ovarian torsion as pain is dull in nature without nausea and vomiting. GYN will be on standby if to go to OR with general surgery.    Discussed with Dr. Kinney    Addendum:    Subjective Hx, Physical Exam, Laboratory & Imaging results reviewed.  I agree with the Resident Physician's assessment and plan of care, as discussed above.  Call and follow up parameters reviewed at length.  She was given the opportunity to ask questions and all were addressed.     Zak Kinney, DO     A/P: CRYSTAL BURNETT is a 48y  LMP 3/13/24 who presented to the ED for LLQ pain, TVUS significant for 4.7cm L ovarian cyst and 2.9cm R ovarian cysts.    -VSS  -H/H: 7.4 > repeat in 12 hours  -TVUS: 4.0 cm complex left ovarian cyst most likely reflecting hemorrhagic cyst. Differential diagnosis includes endometrioma. 2.9cm simple appearing cyst on R side. Follow-up ultrasound in 6-8weeks is recommended.  -CT: Mildly prominent small bowel loops in the right side of the abdomen, ileus versus partial small bowel obstruction.   -bhcg: neg    Patient with hx of ovarian cyst, based on TVUS likely hemorraghic in origin. Tylenol PRN for pain control, plan to repeat H/H in 12 hours. Low suspicion for ovarian torsion as pain is dull in nature without nausea and vomiting. GYN will be on standby if to go to OR with general surgery.    Discussed with Dr. Kinney    Addendum:    Subjective Hx, Physical Exam, Laboratory & Imaging results reviewed.  I agree with the Resident Physician's assessment and plan of care, as discussed above.  Call and follow up parameters reviewed at length.  She was given the opportunity to ask questions and all were addressed.     Zak Kinney, DO    On coming OB hospitalist addendum:  Pt reevaluated in ED for LLQ pain.  CT and pelvic sono evaluated and sono showed a 4 x 3 x 4.1 cm complex cyst likely a hemorrhagic cyst but ddx also include endometrioma.  There is good flow to both ovaries with no evidence of torsion.  Small amount of free fluid is noted.  Pt's follow up cbc did show a drop of hemoglobin from 7.4 to 6.7.  However, repeat showed hemoglobin returned to 7.5.  Pt refused blood transfusion and is currently hemodynamically stable.  At this time there is no evidence of active bleeding from her hemorrhagic cyst especially given her stable hemoglobin level.    No acute gyn issues.  Will recommend outpatient follow up of her hemorrhagic cyst.    MAURI Mathur MD (OB hospitalist)

## 2024-03-28 NOTE — ED PROCEDURE NOTE - ATTENDING CONTRIBUTION TO CARE
Dr. Bobby, Attending Physician-  I performed a face to face bedside interview with patient regarding history of present illness, review of symptoms and past medical history. I completed an independent physical exam.  I have discussed patient's plan of care with the resident.
Statement Selected

## 2024-03-28 NOTE — H&P ADULT - ASSESSMENT
48y  LMP 3/13/24, hx of open gastric bypass , SBO x2 required FIORELLA , hx of chronic anemia, Zoroastrian. comes to ED with 2 days of abdominal pain LLQ ,  nausea. / , last last BM was yesterday , passing flatus. denies fevers/chills,  lightheadedness/dizziness, SOB/chest pain.  Denies abnormal discharge and current vaginal bleeding. follows with Dr. Vigil at [facility] for GYN care, last seen . denies any amaya / hematochezia menorrhagia.    ct abd / p in ed showed ileus / vs partial sbo. seen by surgery. ruled out sbo/ ileus. us [martha/ tv shows left hemorrhagic cyst. seen by gyne. Discussed case with gyne attending dr. Arabella ness , no acute interventions at this time. recommending medicine admission.     >LLQ abd pain / likely due to hemorrhagic cyst   -< from: US Transvaginal/ pelvic us  (24 @ 04:16) >4.0 cm complex left ovarian cyst most likely reflecting hemorrhagic cyst. Differential diagnosis includes endometrioma. Follow-up ultrasound in 6-8weeks is recommended.  Flow identified within both ovaries at time of imaging. small cul de sac free fluid noted   -< from: CT Abdomen and Pelvis w/ Oral Cont and w/ IV Cont (24 @ 01:43) >Findings concerning for ileus versus at least partial small bowel obstruction with oral contrast visualized within decompressed distal small bowel and large bowel loops. No overt bowel wall thickening or mesenteric edema. Cholelithiasis. Incompletely characterized left adnexal complex cystic lesion measuring 4.2 x 3.8 cm for which pelvic ultrasound is suggested.  - seen by gen surgery , less likely small bowel obstruction, no acute interventions at this time.   - mild ileus could be due to ovarian cyst   - seen by Gyne in ed, discussed case with dr. neetu ness : recommending admission to medicine and no acute interventions at this time  - clear liquid diet   - ivf   - monitor on tele     > anemia / acute blood loss anemia with hx of anemia of chronic dz  - no recent labs available for baseline hb    - patient Zoroastrian , refusing blood or blood products   - monitor h/h   - started on , cyanocobalamin Injectable 1000 MICROGram(s) IntraMuscular once, epoetin rashad (PROCRIT) Injectable 12450 Unit(s) SubCutaneous <User Schedule>, folic acid Injectable 1 milliGRAM(s) IV Push daily, iron sucrose IVPB 200 milliGRAM(s) IV Intermittent every 24 hours, pantoprazole    Tablet 40 milliGRAM(s) Oral before breakfast, ascorbic acid 500 milliGRAM(s) Oral daily  - heme onc consulted , dr. kay     >dvt ppx : scds     > plan of care discussed with patient ,  at bedside, ed , gyne

## 2024-03-28 NOTE — CONSULT NOTE ADULT - SUBJECTIVE AND OBJECTIVE BOX
Hematology Consult Note    HPI:  48y PMH open gastric bypass 2000, SBO x2 required FIORELLA, who presented to the ED for LLQ pain x3days. Patient JW found to have hgb 7.4.    Allergies    No Known Allergies    Intolerances        MEDICATIONS  (STANDING):  ascorbic acid 500 milliGRAM(s) Oral daily  cyanocobalamin Injectable 1000 MICROGram(s) IntraMuscular once  epoetin rashad (PROCRIT) Injectable 09713 Unit(s) SubCutaneous <User Schedule>  folic acid Injectable 1 milliGRAM(s) IV Push daily  iron sucrose IVPB 200 milliGRAM(s) IV Intermittent every 24 hours  pantoprazole    Tablet 40 milliGRAM(s) Oral before breakfast  sodium chloride 0.9%. 1000 milliLiter(s) (100 mL/Hr) IV Continuous <Continuous>    MEDICATIONS  (PRN):      PAST MEDICAL & SURGICAL HISTORY:  Anemia of chronic disease      Refusal of blood transfusions as patient is Anabaptism      S/P gastric surgery          FAMILY HISTORY:      SOCIAL HISTORY: No EtOH, no tobacco    REVIEW OF SYSTEMS:    CONSTITUTIONAL: No weakness, fevers or chills  EYES/ENT: No visual changes;  No vertigo or throat pain   NECK: No pain or stiffness  RESPIRATORY: No cough, wheezing, hemoptysis; No shortness of breath  CARDIOVASCULAR: No chest pain or palpitations  GASTROINTESTINAL: No abdominal or epigastric pain. No nausea, vomiting, or hematemesis; No diarrhea or constipation. No melena or hematochezia.  GENITOURINARY: No dysuria, frequency or hematuria  NEUROLOGICAL: No numbness or weakness  SKIN: No itching, burning, rashes, or lesions   All other review of systems is negative unless indicated above.    Height (cm): 162.6 (03-27 @ 20:02)  Weight (kg): 85.5 (03-27 @ 20:02)  BMI (kg/m2): 32.3 (03-27 @ 20:02)  BSA (m2): 1.91 (03-27 @ 20:02)    T(F): 97.8 (03-28-24 @ 07:45), Max: 98.2 (03-28-24 @ 04:56)  HR: 72 (03-28-24 @ 09:53)  BP: 105/65 (03-28-24 @ 09:53)  RR: 18 (03-28-24 @ 09:53)  SpO2: 99% (03-28-24 @ 09:53)  Wt(kg): --    GENERAL: NAD, well-developed  HEAD:  Atraumatic, Normocephalic  EYES: EOMI, PERRLA, conjunctiva and sclera clear  NECK: Supple, No JVD  CHEST/LUNG: Clear to auscultation bilaterally; No wheeze  HEART: Regular rate and rhythm; No murmurs, rubs, or gallops  ABDOMEN: Soft, Nontender, Nondistended; Bowel sounds present  EXTREMITIES:  2+ Peripheral Pulses, No clubbing, cyanosis, or edema  NEUROLOGY: non-focal  SKIN: No rashes or lesions                          7.5    6.12  )-----------( 251      ( 28 Mar 2024 11:45 )             27.6       03-27    140  |  104  |  11.5  ----------------------------<  92  3.9   |  22.0  |  0.51    Ca    8.6      27 Mar 2024 21:41    TPro  7.0  /  Alb  3.8  /  TBili  0.3<L>  /  DBili  x   /  AST  30  /  ALT  21  /  AlkPhos  90  03-27      < from: CT Abdomen and Pelvis w/ Oral Cont and w/ IV Cont (03.28.24 @ 01:43) >  ACC: [id number] EXAM:  CT ABDOMEN AND PELVIS OC IC   ORDERED BY: JOSE AWAD     *** ADDENDUM # 1 ***    Gastric Meka-en-Y postoperative changes with patulous distention of the   proximal and distal anastomosis, most pronounced proximally. Mildly   prominent small bowel loops in the right side of the abdomen, ileus   versus partial small bowel obstruction. Abdominal radiographic imaging   follow-up if clinically warranted.    --- End of Report ---    *** END OF ADDENDUM # 1 ***      PROCEDURE DATE:  03/28/2024          INTERPRETATION:  CLINICAL INFORMATION: Left lower quadrant abdominal pain.    COMPARISON: None.    PROCEDURE:  CT of the Abdomen and Pelvis was performed with intravenous contrast.  Intravenous contrast: 90 ml Omnipaque 350. 10 ml discarded.  Oral contrast: positive contrast was administered.  Sagittal and coronal reformats were performed.    FINDINGS:    LOWER CHEST: Within normal limits.    LIVER: Within normal limits.  BILE DUCTS: Normal caliber.  GALLBLADDER: Cholelithiasis.  SPLEEN: Within normal limits.  PANCREAS: Within normal limits.  ADRENALS: Within normal limits.  KIDNEYS/URETERS: Within normal limits.    BLADDER: Within normal limits.  REPRODUCTIVE ORGANS: Globular uterus. Incompletely characterized left   adnexal complex cystic lesion measuring 4.2 x 3.8 cm for which pelvic   ultrasound is suggested. Simple right adnexal cyst measuring up to 2.8 cm.    BOWEL: Gastric postoperative changes. Small bowel postsurgical changes.   Patulous anastomotic site measuring measuring 10.8 cm in craniocaudal   dimension in the mid upper abdomen (23:5). Additional mildly prominent   small bowel loops in the right abdomen raising concerning for ileus   versus at least partial small bowel obstruction as oral contrast is   visualized within decompressed distal small bowel and large bowel loops.   No overt bowel wall thickening or mesenteric edema. Normal appendix.  PERITONEUM: No ascites.  VESSELS:  Normal caliber.  RETROPERITONEUM: No lymphadenopathy.  ABDOMINAL WALL: Mild subcutaneous edema.  BONES: Degenerative changes.    IMPRESSION:    Findings concerning for ileus versus at least partial small bowel   obstruction with oral contrast visualized within decompressed distal   small bowel and large bowel loops. No overt bowel wall thickening or   mesenteric edema.    Cholelithiasis.    Incompletely characterized left adnexal complex cystic lesion measuring   4.2 x 3.8 cm for which pelvic ultrasound is suggested.    --- End of Report ---      < from: US Transvaginal (03.28.24 @ 04:16) >  ACC: [id number] EXAM:  US TRANSVAGINAL   ORDERED BY: KOTA DOUGLASS     ACC: [id number] EXAM:  US PELVIC COMPLETE   ORDERED BY: KOTA DOUGLASS     PROCEDURE DATE:  03/28/2024          INTERPRETATION:  CLINICAL INFORMATION: Left lower quadrant pain.    LMP: 3/13/2024    COMPARISON: None available.    TECHNIQUE:  Endovaginal and transabdominal pelvic sonogram. Color and Spectral   Doppler was performed.    FINDINGS:  Uterus: 11.3 x 5.1 x 6.6 cm. Within normal limits.  Endometrium: 16 mm. Within normal limits.    Right ovary: 4.2 x 2.5 x 4.0 cm. Contains a simple appearing cyst   measuring up to 2.9 cm. Normal arterial and venous waveforms.  Left ovary: 5.7 x 3.7 x 5 5 5 cm. Contains a 4.0 x 3.3 x 4.1 cm complex   avascular cyst with internal echoes. Normal arterial and venous waveforms.    Fluid: Small free fluid in cul-de-sac.    IMPRESSION:  4.0 cm complex left ovarian cyst most likely reflecting hemorrhagic cyst.   Differential diagnosis includes endometrioma. Follow-up ultrasound in 6-8  weeks is recommended.    Flow identified within both ovaries at time of imaging.        --- End of Report ---

## 2024-03-28 NOTE — CONSULT NOTE ADULT - ASSESSMENT
ASSESSMENT: Patient is a 48y old female with 48y Female with PSHx of open gastric bypass 2000, SBO x2 required FIORELLA (2007/2015) , presented of 2 days of abdominal pain , CT scan ileus versus partial small bowel obstruction    PLAN:    - no acute surgical intervention   - f/u Gyn recs  - will continue to follow   - Plan discussed with Attending, Dr. Ortiz

## 2024-03-29 ENCOUNTER — TRANSCRIPTION ENCOUNTER (OUTPATIENT)
Age: 49
End: 2024-03-29

## 2024-03-29 LAB
ANISOCYTOSIS BLD QL: SIGNIFICANT CHANGE UP
BASOPHILS # BLD AUTO: 0.05 K/UL — SIGNIFICANT CHANGE UP (ref 0–0.2)
BASOPHILS NFR BLD AUTO: 0.9 % — SIGNIFICANT CHANGE UP (ref 0–2)
BURR CELLS BLD QL SMEAR: PRESENT — SIGNIFICANT CHANGE UP
EOSINOPHIL # BLD AUTO: 0.15 K/UL — SIGNIFICANT CHANGE UP (ref 0–0.5)
EOSINOPHIL NFR BLD AUTO: 2.6 % — SIGNIFICANT CHANGE UP (ref 0–6)
GIANT PLATELETS BLD QL SMEAR: PRESENT — SIGNIFICANT CHANGE UP
HCT VFR BLD CALC: 24.4 % — LOW (ref 34.5–45)
HCT VFR BLD CALC: 25 % — LOW (ref 34.5–45)
HCT VFR BLD CALC: 27.4 % — LOW (ref 34.5–45)
HGB BLD-MCNC: 6.5 G/DL — CRITICAL LOW (ref 11.5–15.5)
HGB BLD-MCNC: 6.7 G/DL — CRITICAL LOW (ref 11.5–15.5)
HGB BLD-MCNC: 7.5 G/DL — LOW (ref 11.5–15.5)
HYPOCHROMIA BLD QL: SIGNIFICANT CHANGE UP
LYMPHOCYTES # BLD AUTO: 1.47 K/UL — SIGNIFICANT CHANGE UP (ref 1–3.3)
LYMPHOCYTES # BLD AUTO: 24.8 % — SIGNIFICANT CHANGE UP (ref 13–44)
MANUAL SMEAR VERIFICATION: SIGNIFICANT CHANGE UP
MCHC RBC-ENTMCNC: 17.8 PG — LOW (ref 27–34)
MCHC RBC-ENTMCNC: 18 PG — LOW (ref 27–34)
MCHC RBC-ENTMCNC: 18 PG — LOW (ref 27–34)
MCHC RBC-ENTMCNC: 26 GM/DL — LOW (ref 32–36)
MCHC RBC-ENTMCNC: 27.4 GM/DL — LOW (ref 32–36)
MCHC RBC-ENTMCNC: 27.5 GM/DL — LOW (ref 32–36)
MCV RBC AUTO: 65.6 FL — LOW (ref 80–100)
MCV RBC AUTO: 65.9 FL — LOW (ref 80–100)
MCV RBC AUTO: 68.3 FL — LOW (ref 80–100)
MICROCYTES BLD QL: SIGNIFICANT CHANGE UP
MONOCYTES # BLD AUTO: 0.26 K/UL — SIGNIFICANT CHANGE UP (ref 0–0.9)
MONOCYTES NFR BLD AUTO: 4.4 % — SIGNIFICANT CHANGE UP (ref 2–14)
NEUTROPHILS # BLD AUTO: 3.62 K/UL — SIGNIFICANT CHANGE UP (ref 1.8–7.4)
NEUTROPHILS NFR BLD AUTO: 61.1 % — SIGNIFICANT CHANGE UP (ref 43–77)
OVALOCYTES BLD QL SMEAR: SLIGHT — SIGNIFICANT CHANGE UP
PLAT MORPH BLD: NORMAL — SIGNIFICANT CHANGE UP
PLATELET # BLD AUTO: 239 K/UL — SIGNIFICANT CHANGE UP (ref 150–400)
PLATELET # BLD AUTO: 266 K/UL — SIGNIFICANT CHANGE UP (ref 150–400)
PLATELET # BLD AUTO: 299 K/UL — SIGNIFICANT CHANGE UP (ref 150–400)
POIKILOCYTOSIS BLD QL AUTO: SLIGHT — SIGNIFICANT CHANGE UP
POLYCHROMASIA BLD QL SMEAR: SLIGHT — SIGNIFICANT CHANGE UP
RBC # BLD: 3.66 M/UL — LOW (ref 3.8–5.2)
RBC # BLD: 3.72 M/UL — LOW (ref 3.8–5.2)
RBC # BLD: 4.16 M/UL — SIGNIFICANT CHANGE UP (ref 3.8–5.2)
RBC # FLD: 20.4 % — HIGH (ref 10.3–14.5)
RBC # FLD: 20.6 % — HIGH (ref 10.3–14.5)
RBC # FLD: 20.8 % — HIGH (ref 10.3–14.5)
RBC BLD AUTO: ABNORMAL
SCHISTOCYTES BLD QL AUTO: SLIGHT — SIGNIFICANT CHANGE UP
SPHEROCYTES BLD QL SMEAR: SLIGHT — SIGNIFICANT CHANGE UP
TARGETS BLD QL SMEAR: SLIGHT — SIGNIFICANT CHANGE UP
VARIANT LYMPHS # BLD: 6.2 % — HIGH (ref 0–6)
WBC # BLD: 5.7 K/UL — SIGNIFICANT CHANGE UP (ref 3.8–10.5)
WBC # BLD: 5.93 K/UL — SIGNIFICANT CHANGE UP (ref 3.8–10.5)
WBC # BLD: 6.26 K/UL — SIGNIFICANT CHANGE UP (ref 3.8–10.5)
WBC # FLD AUTO: 5.7 K/UL — SIGNIFICANT CHANGE UP (ref 3.8–10.5)
WBC # FLD AUTO: 5.93 K/UL — SIGNIFICANT CHANGE UP (ref 3.8–10.5)
WBC # FLD AUTO: 6.26 K/UL — SIGNIFICANT CHANGE UP (ref 3.8–10.5)

## 2024-03-29 PROCEDURE — 99232 SBSQ HOSP IP/OBS MODERATE 35: CPT

## 2024-03-29 PROCEDURE — 99231 SBSQ HOSP IP/OBS SF/LOW 25: CPT

## 2024-03-29 PROCEDURE — 99233 SBSQ HOSP IP/OBS HIGH 50: CPT

## 2024-03-29 RX ORDER — ACETAMINOPHEN 500 MG
650 TABLET ORAL EVERY 6 HOURS
Refills: 0 | Status: DISCONTINUED | OUTPATIENT
Start: 2024-03-29 | End: 2024-03-30

## 2024-03-29 RX ADMIN — PANTOPRAZOLE SODIUM 40 MILLIGRAM(S): 20 TABLET, DELAYED RELEASE ORAL at 05:42

## 2024-03-29 RX ADMIN — Medication 500 MILLIGRAM(S): at 13:22

## 2024-03-29 RX ADMIN — Medication 1 MILLIGRAM(S): at 13:24

## 2024-03-29 RX ADMIN — SODIUM CHLORIDE 100 MILLILITER(S): 9 INJECTION INTRAMUSCULAR; INTRAVENOUS; SUBCUTANEOUS at 20:53

## 2024-03-29 RX ADMIN — Medication 650 MILLIGRAM(S): at 10:15

## 2024-03-29 RX ADMIN — IRON SUCROSE 110 MILLIGRAM(S): 20 INJECTION, SOLUTION INTRAVENOUS at 10:15

## 2024-03-29 RX ADMIN — ERYTHROPOIETIN 20000 UNIT(S): 10000 INJECTION, SOLUTION INTRAVENOUS; SUBCUTANEOUS at 10:15

## 2024-03-29 NOTE — PROGRESS NOTE ADULT - ASSESSMENT
ASSESSMENT: Patient is a 48y old female with 48y Female with PSHx of open gastric bypass 2000, SBO x2 required FIORELLA (2007/2015), presented with 2 days of abdominal pain and CT scan suspicious for early SBO. Pt is not clinically obstructed at this time, found to have hemorrhagic ovarian cyst.    PLAN:    - no acute surgical intervention   - f/u Gyn recs  - will continue to follow

## 2024-03-29 NOTE — DISCHARGE NOTE PROVIDER - HOSPITAL COURSE
48y  LMP 3/13/24, hx of open gastric bypass , SBO x2 required FIORELLA , hx of chronic anemia, Zoroastrian. comes to ED with 2 days of abdominal pain LLQ ,  nausea. Patient admitted on 3/28/24 for further evaluation of abdominal pain, CT abdomen and pelvis completed on admission with possible partial SBO.  GYN team also consulted left ovarian cyst most likely representing a hemorrhagic cyst appreciated on transvaginal and pelvis US. Discussed with both general surgery and GYN no surgical interventions required at this time. Patient now with resolution of her abdominal pain.  Pending repeat 13:00 cbc if H/H stable, patient is medically stable for discharge.      48y  LMP 3/13/24, hx of open gastric bypass , SBO x2 required FIORELLA , hx of chronic anemia, Congregation. comes to ED with 2 days of abdominal pain LLQ ,  nausea. Patient admitted on 3/28/24 for further evaluation of abdominal pain, CT abdomen and pelvis completed on admission with possible partial SBO.  GYN team also consulted left ovarian cyst most likely representing a hemorrhagic cyst appreciated on transvaginal and pelvis US. Discussed with both general surgery and GYN no surgical interventions required at this time. Patient now with resolution of her abdominal pain.      Pending repeat labs tomorrow am and clinical course, patient may be a possible discharge tomorrow     48y  LMP 3/13/24, hx of open gastric bypass , SBO x2 required FIORELLA , hx of chronic anemia, Hinduism admitted with 2 days of abdominal pain LLQ ,  nausea. Patient admitted on 3/28/24 for further evaluation of abdominal pain, CT abdomen and pelvis completed on admission with possible partial SBO.  GYN team also consulted left ovarian cyst most likely representing a hemorrhagic cyst appreciated on transvaginal and pelvis US. No further Gyn or Surg intervention planned. H/H has remained stable after initial dip. Feels well, stable for dsc home with plan for outpt close Heme and Surg f/u

## 2024-03-29 NOTE — DISCHARGE NOTE PROVIDER - CARE PROVIDER_API CALL
Maximo Ortiz  Surgery  [address] [place] [address]  Phone: [phone]  Fax: [phone]  Follow Up Time: 2 weeks    Luis Sinclair  Internal Medicine  [place],    Phone: ()-  Fax: ()-  Follow Up Time: 1-3 days    Dr. Vigil - GYN,   please follow-up with your outpatient GYN as directed  Phone: (   )    -  Fax: (   )    -  Follow Up Time: 1 week    Nasreen Avery)  Hematology  [address] [place] [address]  Phone: [phone]  Fax: [phone]  Follow Up Time: 1 week

## 2024-03-29 NOTE — DISCHARGE NOTE PROVIDER - NSDCCPCAREPLAN_GEN_ALL_CORE_FT
PRINCIPAL DISCHARGE DIAGNOSIS  Diagnosis: Hemorrhagic ovarian cyst  Assessment and Plan of Treatment: You were admitted further evaluation and management of your abdominal pain.  During your hospital admission a CT abdomen and pelvis was completed and was with findings of a partial SBO.  Additionally a pelvic and transvaginal US was completed and was remarkable for a left ovarian cyst.  Both the General Surgery and GYN teams were consulted, no surgical intervention is indicated at this time.  Please follow up with your outpatient GYN as directed. Please continue to take all of your medications as directed, please follow all dietary recommendations, please maintain adequate hydration and nutrition, and please follow up with all of your healthcare providers as directed.      SECONDARY DISCHARGE DIAGNOSES  Diagnosis: Anemia  Assessment and Plan of Treatment: During your hospital admission the Heme-Onc team was consulted for your anemia.  Please continue to take your iron supplements as directed. Please continue to take all of your medications as directed, please follow all dietary recommendations, please maintain adequate hydration and nutrition, and please follow up with all of your healthcare providers as directed.

## 2024-03-29 NOTE — DISCHARGE NOTE PROVIDER - PROVIDER TOKENS
PROVIDER:[TOKEN:[[id number]],FOLLOWUP:[2 weeks]],PROVIDER:[TOKEN:[[id number]],FOLLOWUP:[1-3 days]],FREE:[LAST:[Dr. Vigil - GYN],PHONE:[(   )    -],FAX:[(   )    -],ADDRESS:[please follow-up with your outpatient GYN as directed],FOLLOWUP:[1 week]],PROVIDER:[TOKEN:[[id number]],FOLLOWUP:[1 week]]

## 2024-03-29 NOTE — DISCHARGE NOTE PROVIDER - CARE PROVIDERS DIRECT ADDRESSES
[email or web address]

## 2024-03-29 NOTE — DISCHARGE NOTE PROVIDER - NSDCMRMEDTOKEN_GEN_ALL_CORE_FT
cyanocobalamin 1000 mcg oral tablet: 1 tab(s) orally once a day  folic acid 1 mg oral tablet: 1 tab(s) orally once a day  Multiple Vitamins oral capsule: 1 cap(s) orally once a day   ascorbic acid 500 mg oral tablet: 1 tab(s) orally once a day  cyanocobalamin 1000 mcg oral tablet: 1 tab(s) orally once a day  ferrous sulfate 324 mg (65 mg elemental iron) oral delayed release tablet: 1 tab(s) orally once a day  folic acid 1 mg oral tablet: 1 tab(s) orally once a day  Multiple Vitamins oral capsule: 1 cap(s) orally once a day  pantoprazole 40 mg oral delayed release tablet: 1 tab(s) orally once a day (before a meal) Take 1 hour prior to breakfast (on empty stomach)

## 2024-03-29 NOTE — PROGRESS NOTE ADULT - ASSESSMENT
48y PMH open gastric bypass 2000, SBO x2 required FIORELLA, who presented to the ED for LLQ pain x3days. Patient JW found to have hgb 7.4.    -3/28/24-CT A/P -Gastric Meka-en-Y postoperative changes with patulous distention of the proximal and distal anastomosis, most pronounced proximally. Mildly prominent small bowel loops in the right side of the abdomen, ileus versus partial small bowel obstruction. Incompletely characterized left adnexal complex cystic lesion measuring 4.2 x 3.8 cm  3/28/24- Transvaginal US-4.0 cm complex left ovarian cyst most likely reflecting hemorrhagic cyst.     Vitamin B12, Serum: >2000 pg/mL, Folate, Serum: >20.0    #Anemia  -patient JW not accepting any blood products presenting  w/ partial ?SBO vs ileus   -hgb on admission 7.4 then dropped to 6.7  -patient ordered for IV iron, B12, folic acid, and Procrit   RECS  -send anemia w/u (total iron, TIBC %sat, ferritin,  TSH) please add on to previous labs prior IV iron  -continue IV iron 200mg daily x 5 doses    -continue Folic acid daily   -continue procrit daily  x 3 days and then can kiet down to 3 x week 50,000units M,W,F  -use pediatric tubes when possible  -minimal blood draws if possible   -r/o acute blood loss        Please call with any questions [phone]  Above reviewed with Attending Dr. Hernandez   [facility] [place] [address] [place] [address]  [phone]  *Note not finalized until signed by Attending Physician        48y PMH open gastric bypass 2000, SBO x2 required FIORELLA, who presented to the ED for LLQ pain x3days. Patient JW found to have hgb 7.4.    -3/28/24-CT A/P -Gastric Meka-en-Y postoperative changes with patulous distention of the proximal and distal anastomosis, most pronounced proximally. Mildly prominent small bowel loops in the right side of the abdomen, ileus versus partial small bowel obstruction. Incompletely characterized left adnexal complex cystic lesion measuring 4.2 x 3.8 cm  3/28/24- Transvaginal US-4.0 cm complex left ovarian cyst most likely reflecting hemorrhagic cyst.     Vitamin B12, Serum: >2000 pg/mL, Folate, Serum: >20.0    #Anemia  -patient JW not accepting any blood products presenting  w/ partial ?SBO vs ileus   -hgb on admission 7.4 then dropped to 6.7? dilutional   -patient ordered for IV iron, B12, folic acid, and Procrit   RECS  -send anemia w/u (total iron, TIBC %sat, ferritin,  TSH) please add on to previous labs prior IV iron  -continue IV iron 200mg daily x 5 doses    -continue Folic acid daily   -continue procrit daily  x 3 days and then can kiet down to 3 x week 50,000units M,W,F  -use pediatric tubes when possible  -minimal blood draws if possible   -r/o acute blood loss        Please call with any questions [phone]  Above reviewed with Attending Dr. Hernandez   [facility]  [address] [place] [address]  [phone]  *Note not finalized until signed by Attending Physician        48y PMH open gastric bypass 2000, SBO x2 required FIORELLA, who presented to the ED for LLQ pain x3days. Patient JW found to have hgb 7.4.    -3/28/24-CT A/P -Gastric Meka-en-Y postoperative changes with patulous distention of the proximal and distal anastomosis, most pronounced proximally. Mildly prominent small bowel loops in the right side of the abdomen, ileus versus partial small bowel obstruction. Incompletely characterized left adnexal complex cystic lesion measuring 4.2 x 3.8 cm  3/28/24- Transvaginal US-4.0 cm complex left ovarian cyst most likely reflecting hemorrhagic cyst.     Vitamin B12, Serum: >2000 pg/mL, Folate, Serum: >20.0    #Anemia  -patient JW not accepting any blood products presenting  w/ partial ?SBO vs ileus   -hgb on admission 7.4 then dropped to 6.7? dilutional   -patient ordered for IV iron, B12, folic acid, and Procrit     RECS  -send anemia w/u (total iron, TIBC %sat, ferritin,  TSH) please add on to previous labs prior IV iron  -continue IV iron 200mg daily x 5 doses , completed 2 doses   -continue Folic acid daily   -continue procrit daily  x 3 days and then can kiet down to 3 x week 50,000units M,W,F  - decline in hb, Possibly Dilutional?   -use pediatric tubes when possible  -minimal blood draws if possible   -r/o acute blood loss      Please call with any questions [phone]  Above reviewed with Attending Dr. Hernandez   [place] [address] [place] [address]  [phone]  *Note not finalized until signed by Attending Physician

## 2024-03-29 NOTE — PROGRESS NOTE ADULT - ASSESSMENT
48y  LMP 3/13/24, hx of open gastric bypass , SBO x2 required FIORELLA , hx of chronic anemia, Pentecostalism. comes to ED with 2 days of abdominal pain LLQ ,  nausea. /24 , last last BM was yesterday , passing flatus. denies fevers/chills,  lightheadedness/dizziness, SOB/chest pain.  Denies abnormal discharge and current vaginal bleeding. follows with Dr. Vigil at [facility] for GYN care, last seen . denies any amaya / hematochezia menorrhagia.    ct abd / p in ed showed ileus / vs partial sbo. seen by surgery. ruled out sbo/ ileus. us [martha/ tv shows left hemorrhagic cyst. seen by gyne. Discussed case with gyne attending dr. Arabella ness , no acute interventions at this time. recommending medicine admission.       1. LLQ abd pain / likely due to hemorrhagic cyst   pain improved.   Discussed with GYN Dr. Mahmood, per her no surgical intervention and pt should go home with outpatient follow up.       2. Microcytic anemia is likely acute on chronic secondary to acute blood loss anemia with hx of anemia of chronic dz  She reports being anemic all the time secondary to menorrhea   - patient Pentecostalism , refusing blood or blood products   H/h is trending down   She is on  cyanocobalamin Injectable 1000 mg,  epoetin rashad  03774 Unit(s) Sc,  folic acid  1 mg IV Push daily, iron sucrose IVPB 200 mg , pantoprazole    Tablet 40 mg PO  before breakfast and ascorbic acid 500 mg PO daily   Discussed with patient and her  in detail: will monitor H/h. repeat this afternoon showed drop in hemoglobin from 7.4 to 6.4. She will continue with above meds.   Repeat H/h in AM, if improves, can be discharged home       plan of care discussed with patient ,  at bedside and GYN Dr. Mahmood

## 2024-03-29 NOTE — PROGRESS NOTE ADULT - ATTENDING COMMENTS
Patient re-assessed at ~9am this morning  Pain and tenderness markedly improved  Downtrending H/H appreciated. Repeat CBC sent at time of re-evaluation  Will follow-up repeat lab results  No emergent surgical intervention at this time. Will follow

## 2024-03-29 NOTE — DISCHARGE NOTE PROVIDER - ATTENDING DISCHARGE PHYSICAL EXAMINATION:
Gen : Non toxic appearing, comfortable, NAD  HEENT : NCAT, MMM, conjunctival pallor,  No cervical lymphadenopathy, no JVD  CVS : S1S2, regular rate and rhythm, no murmurs  Resp : CTA b/l, no rhonchi or wheezes appreciated   Abd : Soft, non distended, non tender, BS +ve   MSK : No joint swelling or tenderness, no digital clubbing, no distal cyanosis  Neuro : CN II-XII intact, no focal motor or sensory deficits   Psych : Pleasant, no anxiety, normal affect

## 2024-03-29 NOTE — PROGRESS NOTE ADULT - ASSESSMENT
47 y/o with hx of multiple abdominal surgeries and hemorrhagic cyst admitted with LLQ pain     -gyn consulted due to hemorrhagic cyst and on initial consult no plan for surgical intervention    - on re-evaluation patient is reporting overall improvement in pain, and states that she can now touch her abdomen when she could not do that on admission, Vital are overall stable with range 95 -117/63-80 and no tachycardia    - reviewed with patient pathophysiology of cyst and role for conservative management if overall clinically stable, as risk of a procedure given her surgical history would be significant    - Chronic anemia - pt initial hgb 7.4 with daily variations and lowest reading 6.5 and today 6.7. Pt reports history of anemia and at this time denies any lightheadedness/dizzines. Pt received IV iron    - no actute gyn intervention at this time, would recommend rpt ultrasound in 6-8 weeks.      Jaelyn Lima MD

## 2024-03-29 NOTE — DISCHARGE NOTE PROVIDER - DISCHARGE DIET
Patient provided with instructions for incentive spirometer use, verbalized understanding and demonstrated correct usage. Ambulatory off of unit in no acute distress.    Regular Diet - No restrictions

## 2024-03-30 ENCOUNTER — TRANSCRIPTION ENCOUNTER (OUTPATIENT)
Age: 49
End: 2024-03-30

## 2024-03-30 VITALS
DIASTOLIC BLOOD PRESSURE: 59 MMHG | RESPIRATION RATE: 18 BRPM | SYSTOLIC BLOOD PRESSURE: 108 MMHG | HEART RATE: 87 BPM | OXYGEN SATURATION: 99 % | TEMPERATURE: 98 F

## 2024-03-30 LAB
ACANTHOCYTES BLD QL SMEAR: SLIGHT — SIGNIFICANT CHANGE UP
ANION GAP SERPL CALC-SCNC: 14 MMOL/L — SIGNIFICANT CHANGE UP (ref 5–17)
ANISOCYTOSIS BLD QL: SIGNIFICANT CHANGE UP
BASOPHILS # BLD AUTO: 0 K/UL — SIGNIFICANT CHANGE UP (ref 0–0.2)
BASOPHILS NFR BLD AUTO: 0 % — SIGNIFICANT CHANGE UP (ref 0–2)
BUN SERPL-MCNC: 6.3 MG/DL — LOW (ref 8–20)
CALCIUM SERPL-MCNC: 8 MG/DL — LOW (ref 8.4–10.5)
CHLORIDE SERPL-SCNC: 112 MMOL/L — HIGH (ref 96–108)
CO2 SERPL-SCNC: 20 MMOL/L — LOW (ref 22–29)
CREAT SERPL-MCNC: 0.49 MG/DL — LOW (ref 0.5–1.3)
DACRYOCYTES BLD QL SMEAR: SLIGHT — SIGNIFICANT CHANGE UP
EGFR: 117 ML/MIN/1.73M2 — SIGNIFICANT CHANGE UP
EOSINOPHIL # BLD AUTO: 0.23 K/UL — SIGNIFICANT CHANGE UP (ref 0–0.5)
EOSINOPHIL NFR BLD AUTO: 3.5 % — SIGNIFICANT CHANGE UP (ref 0–6)
GIANT PLATELETS BLD QL SMEAR: PRESENT — SIGNIFICANT CHANGE UP
GLUCOSE SERPL-MCNC: 94 MG/DL — SIGNIFICANT CHANGE UP (ref 70–99)
HCT VFR BLD CALC: 24.8 % — LOW (ref 34.5–45)
HGB BLD-MCNC: 6.6 G/DL — CRITICAL LOW (ref 11.5–15.5)
LYMPHOCYTES # BLD AUTO: 1.15 K/UL — SIGNIFICANT CHANGE UP (ref 1–3.3)
LYMPHOCYTES # BLD AUTO: 17.5 % — SIGNIFICANT CHANGE UP (ref 13–44)
MAGNESIUM SERPL-MCNC: 2 MG/DL — SIGNIFICANT CHANGE UP (ref 1.8–2.6)
MANUAL SMEAR VERIFICATION: SIGNIFICANT CHANGE UP
MCHC RBC-ENTMCNC: 17.8 PG — LOW (ref 27–34)
MCHC RBC-ENTMCNC: 26.6 GM/DL — LOW (ref 32–36)
MCV RBC AUTO: 67 FL — LOW (ref 80–100)
MICROCYTES BLD QL: SIGNIFICANT CHANGE UP
MONOCYTES # BLD AUTO: 0.29 K/UL — SIGNIFICANT CHANGE UP (ref 0–0.9)
MONOCYTES NFR BLD AUTO: 4.4 % — SIGNIFICANT CHANGE UP (ref 2–14)
NEUTROPHILS # BLD AUTO: 4.68 K/UL — SIGNIFICANT CHANGE UP (ref 1.8–7.4)
NEUTROPHILS NFR BLD AUTO: 71.1 % — SIGNIFICANT CHANGE UP (ref 43–77)
PLAT MORPH BLD: NORMAL — SIGNIFICANT CHANGE UP
PLATELET # BLD AUTO: 289 K/UL — SIGNIFICANT CHANGE UP (ref 150–400)
POIKILOCYTOSIS BLD QL AUTO: SLIGHT — SIGNIFICANT CHANGE UP
POLYCHROMASIA BLD QL SMEAR: SLIGHT — SIGNIFICANT CHANGE UP
POTASSIUM SERPL-MCNC: 3.3 MMOL/L — LOW (ref 3.5–5.3)
POTASSIUM SERPL-SCNC: 3.3 MMOL/L — LOW (ref 3.5–5.3)
RBC # BLD: 3.7 M/UL — LOW (ref 3.8–5.2)
RBC # FLD: 20.8 % — HIGH (ref 10.3–14.5)
RBC BLD AUTO: ABNORMAL
SODIUM SERPL-SCNC: 146 MMOL/L — HIGH (ref 135–145)
STOMATOCYTES BLD QL SMEAR: SLIGHT — SIGNIFICANT CHANGE UP
VARIANT LYMPHS # BLD: 3.5 % — SIGNIFICANT CHANGE UP (ref 0–6)
WBC # BLD: 6.58 K/UL — SIGNIFICANT CHANGE UP (ref 3.8–10.5)
WBC # FLD AUTO: 6.58 K/UL — SIGNIFICANT CHANGE UP (ref 3.8–10.5)

## 2024-03-30 PROCEDURE — 74177 CT ABD & PELVIS W/CONTRAST: CPT | Mod: MC

## 2024-03-30 PROCEDURE — 76856 US EXAM PELVIC COMPLETE: CPT

## 2024-03-30 PROCEDURE — 99239 HOSP IP/OBS DSCHRG MGMT >30: CPT

## 2024-03-30 PROCEDURE — 86900 BLOOD TYPING SEROLOGIC ABO: CPT

## 2024-03-30 PROCEDURE — 85027 COMPLETE CBC AUTOMATED: CPT

## 2024-03-30 PROCEDURE — 96374 THER/PROPH/DIAG INJ IV PUSH: CPT

## 2024-03-30 PROCEDURE — 82746 ASSAY OF FOLIC ACID SERUM: CPT

## 2024-03-30 PROCEDURE — 86850 RBC ANTIBODY SCREEN: CPT

## 2024-03-30 PROCEDURE — 76830 TRANSVAGINAL US NON-OB: CPT

## 2024-03-30 PROCEDURE — 99232 SBSQ HOSP IP/OBS MODERATE 35: CPT

## 2024-03-30 PROCEDURE — 84702 CHORIONIC GONADOTROPIN TEST: CPT

## 2024-03-30 PROCEDURE — 96375 TX/PRO/DX INJ NEW DRUG ADDON: CPT

## 2024-03-30 PROCEDURE — 83735 ASSAY OF MAGNESIUM: CPT

## 2024-03-30 PROCEDURE — 83605 ASSAY OF LACTIC ACID: CPT

## 2024-03-30 PROCEDURE — 82607 VITAMIN B-12: CPT

## 2024-03-30 PROCEDURE — 81003 URINALYSIS AUTO W/O SCOPE: CPT

## 2024-03-30 PROCEDURE — 80053 COMPREHEN METABOLIC PANEL: CPT

## 2024-03-30 PROCEDURE — 85025 COMPLETE CBC W/AUTO DIFF WBC: CPT

## 2024-03-30 PROCEDURE — 86901 BLOOD TYPING SEROLOGIC RH(D): CPT

## 2024-03-30 PROCEDURE — 80048 BASIC METABOLIC PNL TOTAL CA: CPT

## 2024-03-30 PROCEDURE — 36415 COLL VENOUS BLD VENIPUNCTURE: CPT

## 2024-03-30 PROCEDURE — 99285 EMERGENCY DEPT VISIT HI MDM: CPT

## 2024-03-30 RX ORDER — ASCORBIC ACID 60 MG
1 TABLET,CHEWABLE ORAL
Qty: 30 | Refills: 0
Start: 2024-03-30 | End: 2024-04-28

## 2024-03-30 RX ORDER — FERROUS SULFATE 325(65) MG
1 TABLET ORAL
Qty: 30 | Refills: 0
Start: 2024-03-30 | End: 2024-04-28

## 2024-03-30 RX ORDER — PANTOPRAZOLE SODIUM 20 MG/1
1 TABLET, DELAYED RELEASE ORAL
Qty: 14 | Refills: 0
Start: 2024-03-30 | End: 2024-04-12

## 2024-03-30 RX ORDER — POTASSIUM CHLORIDE 20 MEQ
40 PACKET (EA) ORAL
Refills: 0 | Status: COMPLETED | OUTPATIENT
Start: 2024-03-30 | End: 2024-03-30

## 2024-03-30 RX ORDER — PREGABALIN 225 MG/1
1 CAPSULE ORAL
Qty: 30 | Refills: 0
Start: 2024-03-30 | End: 2024-04-28

## 2024-03-30 RX ORDER — FOLIC ACID 0.8 MG
1 TABLET ORAL
Qty: 30 | Refills: 0
Start: 2024-03-30 | End: 2024-04-28

## 2024-03-30 RX ADMIN — PANTOPRAZOLE SODIUM 40 MILLIGRAM(S): 20 TABLET, DELAYED RELEASE ORAL at 06:09

## 2024-03-30 RX ADMIN — Medication 40 MILLIEQUIVALENT(S): at 08:43

## 2024-03-30 RX ADMIN — ERYTHROPOIETIN 20000 UNIT(S): 10000 INJECTION, SOLUTION INTRAVENOUS; SUBCUTANEOUS at 08:38

## 2024-03-30 RX ADMIN — Medication 500 MILLIGRAM(S): at 11:46

## 2024-03-30 RX ADMIN — Medication 1 MILLIGRAM(S): at 11:45

## 2024-03-30 RX ADMIN — Medication 40 MILLIEQUIVALENT(S): at 11:45

## 2024-03-30 RX ADMIN — SODIUM CHLORIDE 100 MILLILITER(S): 9 INJECTION INTRAMUSCULAR; INTRAVENOUS; SUBCUTANEOUS at 06:11

## 2024-03-30 RX ADMIN — IRON SUCROSE 110 MILLIGRAM(S): 20 INJECTION, SOLUTION INTRAVENOUS at 11:45

## 2024-03-30 RX ADMIN — Medication 650 MILLIGRAM(S): at 06:11

## 2024-03-30 RX ADMIN — Medication 650 MILLIGRAM(S): at 06:58

## 2024-03-30 NOTE — PROGRESS NOTE ADULT - ASSESSMENT
ASSESSMENT: Patient is a 48y old female with 48y Female with PSHx of open gastric bypass 2000, SBO x2 required FIORELLA (2007/2015), presented with 2 days of abdominal pain and CT scan suspicious for early SBO. Pt is not clinically obstructed at this time, found to have hemorrhagic ovarian cyst.    PLAN:    - no acute surgical intervention   - f/u Gyn recs  -Trend Hb  -Monitor vitals  - will continue to follow    ASSESSMENT: Patient is a 48y old female with 48y Female with PSHx of open gastric bypass 2000, SBO x2 required FIOERLLA (2007/2015), presented with 2 days of abdominal pain and CT scan suspicious for early SBO. Pt is not clinically obstructed at this time, found to have hemorrhagic ovarian cyst.    PLAN:    - no bariatric surgical intervention indicated at this time  - will continue to follow

## 2024-03-30 NOTE — DIETITIAN INITIAL EVALUATION ADULT - OTHER INFO
48y old female with 48y Female with PSHx of open gastric bypass 2000, SBO x2 required FIORELLA (2007/2015), presented with 2 days of abdominal pain and CT scan suspicious for early SBO. Pt is not clinically obstructed at this time, found to have hemorrhagic ovarian cyst.

## 2024-03-30 NOTE — DIETITIAN INITIAL EVALUATION ADULT - NSICDXPASTMEDICALHX_GEN_ALL_CORE_FT
PAST MEDICAL HISTORY:  Anemia of chronic disease     Refusal of blood transfusions as patient is Oriental orthodox

## 2024-03-30 NOTE — CHART NOTE - NSCHARTNOTEFT_GEN_A_CORE
Pt examined laying bd  Stats she feels much better  No abd pain.   BM today light brown, soft  Counseled on diet / activity and f/u   Medically stable for dsc home with outpt f/u
EVENT: pt c/o pain    HPI: 48y  LMP 3/13/24, hx of open gastric bypass , SBO x2 required FIORELLA , hx of chronic anemia, Ep[employer]. comes to ED with 2 days of abdominal pain LLQ ,  nausea. / , last last BM was yesterday , passing flatus. denies fevers/chills,  lightheadedness/dizziness, SOB/chest pain.  Denies abnormal discharge and current vaginal bleeding. follows with Dr. Vigil at [facility] for GYN care, last seen . denies any amaya / hematochezia menorrhagia.    ct abd / p in ed showed ileus / vs partial sbo. seen by surgery. ruled out sbo/ ileus. us [martha/ tv shows left hemorrhagic cyst. seen by gyne. Discussed case with gyne attending dr. Arabella ness , no acute interventions at this time. recommending medicine admission.    (28 Mar 2024 11:48)    Acute pain due to hemorrhagic Cyst  will give tylenol IV x1 now        MEDICATIONS  (STANDING):  ascorbic acid 500 milliGRAM(s) Oral daily  epoetin rashad-epbx (RETACRIT) Injectable 93402 Unit(s) SubCutaneous <User Schedule>  folic acid Injectable 1 milliGRAM(s) IV Push daily  iron sucrose IVPB 200 milliGRAM(s) IV Intermittent every 24 hours  pantoprazole    Tablet 40 milliGRAM(s) Oral before breakfast  sodium chloride 0.9%. 1000 milliLiter(s) (100 mL/Hr) IV Continuous <Continuous>    MEDICATIONS  (PRN):      VITALS:  Vital Signs Last 24 Hrs  T(C): 36.7 (28 Mar 2024 15:48), Max: 37.2 (28 Mar 2024 14:30)  T(F): 98 (28 Mar 2024 15:48), Max: 99 (28 Mar 2024 14:30)  HR: 83 (28 Mar 2024 15:48) (71 - 93)  BP: 100/67 (28 Mar 2024 15:48) (96/63 - 117/80)  BP(mean): --  RR: 18 (28 Mar 2024 15:48) (10 - 20)  SpO2: 98% (28 Mar 2024 15:48) (95% - 100%)    Parameters below as of 28 Mar 2024 15:48  Patient On (Oxygen Delivery Method): room air            LABS:                        7.5    6.12  )-----------( 251      ( 28 Mar 2024 11:45 )             27.6         140  |  104  |  11.5  ----------------------------<  92  3.9   |  22.0  |  0.51    Ca    8.6      27 Mar 2024 21:41    TPro  7.0  /  Alb  3.8  /  TBili  0.3<L>  /  DBili  x   /  AST  30  /  ALT  21  /  AlkPhos  90      LIVER FUNCTIONS - ( 27 Mar 2024 21:41 )  Alb: 3.8 g/dL / Pro: 7.0 g/dL / ALK PHOS: 90 U/L / ALT: 21 U/L / AST: 30 U/L / GGT: x             Urinalysis Basic - ( 27 Mar 2024 21:53 )    Color: Yellow / Appearance: Clear / S.028 / pH: x  Gluc: x / Ketone: Negative mg/dL  / Bili: Negative / Urobili: 2.0 mg/dL   Blood: x / Protein: Negative mg/dL / Nitrite: Negative   Leuk Esterase: Negative / RBC: x / WBC x   Sq Epi: x / Non Sq Epi: x / Bacteria: x          Blood, Urine: Negative ( @ 21:53)

## 2024-03-30 NOTE — DIETITIAN INITIAL EVALUATION ADULT - ADD RECOMMEND
Continue iron, folic acid, and vitamin C supplementation. Encourage iron rich foods. Encourage po intake, monitor diet tolerance, and provide assistance at meals as needed. Obtain daily weights to monitor trends.

## 2024-03-30 NOTE — PROGRESS NOTE ADULT - REASON FOR ADMISSION
LLQ pain, anemia , Tenriism
LLQ pain, anemia , Advent
LLQ pain, anemia , Latter day
LLQ pain, anemia , Presybeterian
LLQ pain, anemia , Mandaen

## 2024-03-30 NOTE — DIETITIAN INITIAL EVALUATION ADULT - PERTINENT MEDS FT
MEDICATIONS  (STANDING):  ascorbic acid 500 milliGRAM(s) Oral daily  epoetin rashad-epbx (RETACRIT) Injectable 89312 Unit(s) SubCutaneous <User Schedule>  folic acid Injectable 1 milliGRAM(s) IV Push daily  influenza   Vaccine 0.5 milliLiter(s) IntraMuscular once  iron sucrose IVPB 200 milliGRAM(s) IV Intermittent every 24 hours  pantoprazole    Tablet 40 milliGRAM(s) Oral before breakfast  potassium chloride   Powder 40 milliEquivalent(s) Oral every 2 hours  sodium chloride 0.9%. 1000 milliLiter(s) (100 mL/Hr) IV Continuous <Continuous>

## 2024-03-30 NOTE — PROGRESS NOTE ADULT - SUBJECTIVE AND OBJECTIVE BOX
Heme/Onc Progress note    INTERVAL HPI/OVERNIGHT EVENTS:  Patient S&E at bedside. patient had another drop in hgb but stated feeling better then previous day, resting comfortably. No complaints at this time. Patient denies fever, chills, dizziness, weakness, CP, palpitations, SOB, cough, N/V/D/C, dysuria, changes in bowel movements, LE edema.    VITAL SIGNS:  T(F): 98.4 (24 @ 08:03)  HR: 82 (24 @ 08:03)  BP: 109/61 (24 @ 08:03)  RR: 18 (24 @ 08:03)  SpO2: 94% (24 @ 08:03)  Wt(kg): --    PHYSICAL EXAM:    Constitutional: NAD  Eyes: EOMI, sclera non-icteric  Neck: supple, no masses, no JVD  Respiratory: CTA b/l, good air entry b/l  Cardiovascular: RRR, no M/R/G  Gastrointestinal: soft, NTND, no masses palpable, + BS,   Extremities: no c/c/e  Neurological: AAOx3      MEDICATIONS  (STANDING):  ascorbic acid 500 milliGRAM(s) Oral daily  epoetin rashad-epbx (RETACRIT) Injectable 20559 Unit(s) SubCutaneous <User Schedule>  folic acid Injectable 1 milliGRAM(s) IV Push daily  influenza   Vaccine 0.5 milliLiter(s) IntraMuscular once  iron sucrose IVPB 200 milliGRAM(s) IV Intermittent every 24 hours  pantoprazole    Tablet 40 milliGRAM(s) Oral before breakfast  sodium chloride 0.9%. 1000 milliLiter(s) (100 mL/Hr) IV Continuous <Continuous>    MEDICATIONS  (PRN):  acetaminophen     Tablet .. 650 milliGRAM(s) Oral every 6 hours PRN Mild Pain (1 - 3), Moderate Pain (4 - 6)      Allergies    No Known Allergies    Intolerances        LABS:                        7.5    5.93  )-----------( 299      ( 29 Mar 2024 08:55 )             27.4     03-    140  |  104  |  11.5  ----------------------------<  92  3.9   |  22.0  |  0.51    Ca    8.6      27 Mar 2024 21:41    TPro  7.0  /  Alb  3.8  /  TBili  0.3<L>  /  DBili  x   /  AST  30  /  ALT  21  /  AlkPhos  90        Urinalysis Basic - ( 27 Mar 2024 21:53 )    Color: Yellow / Appearance: Clear / S.028 / pH: x  Gluc: x / Ketone: Negative mg/dL  / Bili: Negative / Urobili: 2.0 mg/dL   Blood: x / Protein: Negative mg/dL / Nitrite: Negative   Leuk Esterase: Negative / RBC: x / WBC x   Sq Epi: x / Non Sq Epi: x / Bacteria: x        RADIOLOGY & ADDITIONAL TESTS:  Studies reviewed.    ASSESSMENT & PLAN: 
Name: CRYSTAL BURNETT  MRN: [id number]  Date Admitted: 03-28-24  Location: [facility] [address] ([facility])  Attending: Geeta Child    SUBJECTIVE:    Patient was seen and examined at bedside. No acute events overnight.   Called to see patient by general surgery for re-evaluation secondary to "hypotension and downtrending h/h". Pt with  at bedside, reports pain has improved since admission. Last pain medication was IV tylenol last night and still improving. Ortiz clears, no  N/V. Denies any lightedness/dizzines. Pt reports a history of anemia for which she get iron infusion. ALso reports BPs typically on the lower end. Reports + flatus.     OBJECTIVE:   T(C): 36.9 (03-29-24 @ 08:03), Max: 37.2 (03-28-24 @ 14:30)  T(F): 98.4 (03-29-24 @ 08:03), Max: 99 (03-28-24 @ 14:30)  HR: 82 (03-29-24 @ 08:03) (72 - 90)  BP: 109/61 (03-29-24 @ 08:03) (95/63 - 117/80)  BP(mean): --  RR: 18 (03-29-24 @ 08:03) (18 - 18)  SpO2: 94% (03-29-24 @ 08:03) (94% - 99%)    Physical Exam:  Gen: NAD, AOx3  Abd: Soft, non-distended, no guarding or rebound tenderness.       LABS:                        6.7    6.26  )-----------( 266      ( 29 Mar 2024 04:27 )             24.4       [facility] Meds:  ascorbic acid 500 milliGRAM(s) Oral daily  epoetin rashad-epbx (RETACRIT) Injectable 41997 Unit(s) SubCutaneous <User Schedule>  folic acid Injectable 1 milliGRAM(s) IV Push daily  influenza   Vaccine 0.5 milliLiter(s) IntraMuscular once  iron sucrose IVPB 200 milliGRAM(s) IV Intermittent every 24 hours  pantoprazole    Tablet 40 milliGRAM(s) Oral before breakfast  sodium chloride 0.9%. 1000 milliLiter(s) IV Continuous <Continuous>  
SUBJECTIVE: Sleeping comfortably this morning. Per bedside RN, no acute events overnight. Hemodynamically stable.     Vitals:  T(C): 36.8 (03-29-24 @ 04:50), Max: 37.2 (03-28-24 @ 14:30)  T(F): 98.2 (03-29-24 @ 04:50), Max: 99 (03-28-24 @ 14:30)  HR: 82 (03-29-24 @ 04:50) (72 - 90)  BP: 99/63 (03-29-24 @ 04:50) (95/63 - 117/80)  ABP: --  ABP(mean): --  RR: 18 (03-29-24 @ 04:50) (10 - 18)  SpO2: 94% (03-29-24 @ 04:50) (94% - 99%)    LABS:  cret                        6.7    6.26  )-----------( 266      ( 29 Mar 2024 04:27 )             24.4     03-27    140  |  104  |  11.5  ----------------------------<  92  3.9   |  22.0  |  0.51    Ca    8.6      27 Mar 2024 21:41    TPro  7.0  /  Alb  3.8  /  TBili  0.3<L>  /  DBili  x   /  AST  30  /  ALT  21  /  AlkPhos  90  03-27    PHYSICAL EXAM:   General: NAD, Lying in bed comfortably  Neuro: A+Ox3  HEENT: EOMI, PERRLA, MMM  Cardio: RRR  Resp: Non labored breathing on RA  GI/Abd: Soft, LLQ tenderness /ND, no rebound/guarding, no masses palpated  Vascular: All 4 extremities warm and well perfused.   Pelvis: stable  Musculoskeletal: All 4 extremities moving spontaneously, no limitations, no spinal tenderness.  
Patient is a 48y old  Female who presents with a chief complaint of LLQ pain, anemia , Anabaptism (29 Mar 2024 12:16)      INTERVAL HPI/OVERNIGHT EVENTS: seen and examined in CDU.  at bedside. Reports improvement of abdominal pain. Passing gas, but did not have a bowel movement        MEDICATIONS  (STANDING):  ascorbic acid 500 milliGRAM(s) Oral daily  epoetin rashad-epbx (RETACRIT) Injectable 13502 Unit(s) SubCutaneous <User Schedule>  folic acid Injectable 1 milliGRAM(s) IV Push daily  influenza   Vaccine 0.5 milliLiter(s) IntraMuscular once  iron sucrose IVPB 200 milliGRAM(s) IV Intermittent every 24 hours  pantoprazole    Tablet 40 milliGRAM(s) Oral before breakfast  sodium chloride 0.9%. 1000 milliLiter(s) (100 mL/Hr) IV Continuous <Continuous>    MEDICATIONS  (PRN):  acetaminophen     Tablet .. 650 milliGRAM(s) Oral every 6 hours PRN Mild Pain (1 - 3), Moderate Pain (4 - 6)      Allergies    No Known Allergies    Intolerances        REVIEW OF SYSTEMS:  CONSTITUTIONAL: No fever, weight loss, or fatigue  RESPIRATORY: No cough, wheezing, chills or hemoptysis; No shortness of breath  CARDIOVASCULAR: No chest pain, palpitations, dizziness, or leg swelling  GASTROINTESTINAL: No abdominal or epigastric pain. No nausea, vomiting, or hematemesis; No diarrhea or constipation. No melena or hematochezia.  NEUROLOGICAL: No headaches, memory loss, loss of strength, numbness, or tremors  MUSCULOSKELETAL: No joint pain or swelling; No muscle, back, or extremity pain      Vital Signs Last 24 Hrs  T(C): 37.1 (29 Mar 2024 11:35), Max: 37.1 (29 Mar 2024 11:35)  T(F): 98.7 (29 Mar 2024 11:35), Max: 98.7 (29 Mar 2024 11:35)  HR: 83 (29 Mar 2024 11:35) (75 - 83)  BP: 103/62 (29 Mar 2024 11:35) (95/63 - 109/61)  BP(mean): --  RR: 18 (29 Mar 2024 11:35) (18 - 18)  SpO2: 99% (29 Mar 2024 11:35) (94% - 99%)    Parameters below as of 29 Mar 2024 11:35  Patient On (Oxygen Delivery Method): room air        PHYSICAL EXAM:  GENERAL: NAD, laying in bed   HEAD:  Atraumatic, Normocephalic  EYES: EOMI, PERRLA, conjunctiva and sclera clear  NECK: Supple, No JVD, Normal thyroid  NERVOUS SYSTEM:  Alert & Oriented X3, No gross focal deficits  CHEST/LUNG: Clear to percussion bilaterally; No rales, rhonchi, wheezing, or rubs  HEART: Regular rate and rhythm; No murmurs, rubs, or gallops  ABDOMEN: Soft, Nontender, Nondistended; Bowel sounds present  EXTREMITIES:  No clubbing, cyanosis, or edema  SKIN: No rashes or lesions    LABS:                        6.5    5.70  )-----------( 239      ( 29 Mar 2024 13:23 )             25.0     03-    140  |  104  |  11.5  ----------------------------<  92  3.9   |  22.0  |  0.51    Ca    8.6      27 Mar 2024 21:41    TPro  7.0  /  Alb  3.8  /  TBili  0.3<L>  /  DBili  x   /  AST  30  /  ALT  21  /  AlkPhos  90        Urinalysis Basic - ( 27 Mar 2024 21:53 )    Color: Yellow / Appearance: Clear / S.028 / pH: x  Gluc: x / Ketone: Negative mg/dL  / Bili: Negative / Urobili: 2.0 mg/dL   Blood: x / Protein: Negative mg/dL / Nitrite: Negative   Leuk Esterase: Negative / RBC: x / WBC x   Sq Epi: x / Non Sq Epi: x / Bacteria: x      CAPILLARY BLOOD GLUCOSE          RADIOLOGY & ADDITIONAL TESTS:    Imaging Personally Reviewed:  [x ] YES  [ ] NO    Consultant(s) Notes Reviewed:  [ x] YES  [ ] NO    Care Discussed with Consultants/Other Providers [ x] YES  [ ] NO    Plan of Care discussed with Housestaff [x ]YES [ ] NO
Patient seen and examined at bedside. NAEON, hb drifted to 6.5. No blood products per patient preference. No symptoms from anemia and has no abdominal pain or other acute complaints.     MEDICATIONS  (STANDING):  ascorbic acid 500 milliGRAM(s) Oral daily  epoetin rashad-epbx (RETACRIT) Injectable 60407 Unit(s) SubCutaneous <User Schedule>  folic acid Injectable 1 milliGRAM(s) IV Push daily  influenza   Vaccine 0.5 milliLiter(s) IntraMuscular once  iron sucrose IVPB 200 milliGRAM(s) IV Intermittent every 24 hours  pantoprazole    Tablet 40 milliGRAM(s) Oral before breakfast  sodium chloride 0.9%. 1000 milliLiter(s) (100 mL/Hr) IV Continuous <Continuous>    MEDICATIONS  (PRN):  acetaminophen     Tablet .. 650 milliGRAM(s) Oral every 6 hours PRN Mild Pain (1 - 3), Moderate Pain (4 - 6)      Vital Signs Last 24 Hrs  T(C): 36.7 (29 Mar 2024 22:56), Max: 37.1 (29 Mar 2024 11:35)  T(F): 98.1 (29 Mar 2024 22:56), Max: 98.7 (29 Mar 2024 11:35)  HR: 80 (29 Mar 2024 22:56) (74 - 87)  BP: 108/69 (29 Mar 2024 22:56) (98/66 - 119/74)  BP(mean): --  RR: 18 (29 Mar 2024 22:56) (16 - 18)  SpO2: 96% (29 Mar 2024 22:56) (94% - 99%)    Parameters below as of 29 Mar 2024 22:56  Patient On (Oxygen Delivery Method): room air                            6.5    5.70  )-----------( 239      ( 29 Mar 2024 13:23 )             25.0     Exam:  Gen: pt lying in bed, alert, in NAD  Resp: unlabored  CVS: RRR  Abd: soft, NT, ND  Ext: moving all extremities spontaneously, sensation intact, pulses 2+

## 2024-03-30 NOTE — DISCHARGE NOTE NURSING/CASE MANAGEMENT/SOCIAL WORK - PATIENT PORTAL LINK FT
You can access the FollowMyHealth Patient Portal offered by [facility] by registering at the following website: [email or web address]. By joining [employer]’s FollowMyHealth portal, you will also be able to view your health information using other applications (apps) compatible with our system.

## 2024-03-30 NOTE — PROGRESS NOTE ADULT - ATTENDING COMMENTS
Pain/tenderness improved  Abdomen soft  No bariatric surgery intervention indicated at this time.  Will continue to follow while inpatient  Recommend patient have regular outpatient followup as a post-gastric bypass patient. If unable to followup with her bariatric surgeon, she may establish care with me post discharge to continue followup.

## 2024-03-30 NOTE — DIETITIAN INITIAL EVALUATION ADULT - ORAL INTAKE PTA/DIET HISTORY
Nutrition assessment completed. H/o gastric bypass in 2000. Pt reports good appetite/po intake prior to admission and currently. Breakfast tray observed at bedside, pt consumed 100% per plate observation. No weight changes prior to admission reported. Noted with anemia, H/H low. Pt receiving iron, folic acid, and vitamin C supplementation; also received vitamin B12 injection. Provided pt with verbal/written literature on iron rich food sources. Encouraged to consume foods with iron with vitamin C sources to increase absorption. RD to follow up as feasible.  Nutrition assessment completed. H/o gastric bypass in 2000. Pt reports good appetite/po intake prior to admission and currently. Breakfast tray observed at bedside, pt consumed 100% per plate observation. No weight changes prior to admission reported. Noted with microcytic anemia. Pt receiving iron, folic acid, and vitamin C supplementation; also received vitamin B12 injection. Provided pt with verbal/written literature on iron rich food sources. Encouraged to consume foods with iron with vitamin C sources to increase absorption. RD to follow up as feasible.

## 2024-04-08 PROBLEM — Z00.00 ENCOUNTER FOR PREVENTIVE HEALTH EXAMINATION: Status: ACTIVE | Noted: 2024-04-08

## 2024-04-25 ENCOUNTER — APPOINTMENT (OUTPATIENT)
Dept: SURGERY | Facility: CLINIC | Age: 49
End: 2024-04-25

## 2024-06-10 RX ORDER — FOLIC ACID 0.8 MG
1 TABLET ORAL
Refills: 0 | DISCHARGE

## 2024-06-10 RX ORDER — PREGABALIN 225 MG/1
1 CAPSULE ORAL
Refills: 0 | DISCHARGE